# Patient Record
Sex: FEMALE | Race: WHITE | NOT HISPANIC OR LATINO | Employment: UNEMPLOYED | URBAN - METROPOLITAN AREA
[De-identification: names, ages, dates, MRNs, and addresses within clinical notes are randomized per-mention and may not be internally consistent; named-entity substitution may affect disease eponyms.]

---

## 2018-09-10 PROCEDURE — 99284 EMERGENCY DEPT VISIT MOD MDM: CPT

## 2018-09-11 ENCOUNTER — APPOINTMENT (OUTPATIENT)
Dept: ULTRASOUND IMAGING | Facility: HOSPITAL | Age: 23
End: 2018-09-11

## 2018-09-11 ENCOUNTER — HOSPITAL ENCOUNTER (INPATIENT)
Facility: HOSPITAL | Age: 23
LOS: 2 days | Discharge: HOME | End: 2018-09-13
Attending: EMERGENCY MEDICINE | Admitting: INTERNAL MEDICINE

## 2018-09-11 ENCOUNTER — APPOINTMENT (OUTPATIENT)
Dept: GENERAL RADIOLOGY | Facility: HOSPITAL | Age: 23
End: 2018-09-11

## 2018-09-11 DIAGNOSIS — R50.9 FEVER IN ADULT: ICD-10-CM

## 2018-09-11 DIAGNOSIS — N12 PYELONEPHRITIS: Primary | ICD-10-CM

## 2018-09-11 DIAGNOSIS — Z34.91 NORMAL INTRAUTERINE PREGNANCY ON PRENATAL ULTRASOUND IN FIRST TRIMESTER: ICD-10-CM

## 2018-09-11 PROBLEM — F15.20 METHAMPHETAMINE ADDICTION (HCC): Status: ACTIVE | Noted: 2018-09-11

## 2018-09-11 PROBLEM — B18.2 CHRONIC HEPATITIS C AFFECTING PREGNANCY, ANTEPARTUM (HCC): Status: ACTIVE | Noted: 2018-09-11

## 2018-09-11 PROBLEM — O98.419 CHRONIC HEPATITIS C AFFECTING PREGNANCY, ANTEPARTUM (HCC): Status: ACTIVE | Noted: 2018-09-11

## 2018-09-11 PROBLEM — R79.89 ELEVATED LFTS: Status: ACTIVE | Noted: 2018-09-11

## 2018-09-11 PROBLEM — F19.10 IV DRUG ABUSE (HCC): Status: ACTIVE | Noted: 2018-09-11

## 2018-09-11 PROBLEM — O99.331 TOBACCO SMOKING AFFECTING PREGNANCY IN FIRST TRIMESTER: Status: ACTIVE | Noted: 2018-09-11

## 2018-09-11 PROBLEM — A41.9 SEPSIS (HCC): Status: ACTIVE | Noted: 2018-09-11

## 2018-09-11 PROBLEM — Z34.90 PREGNANCY: Status: ACTIVE | Noted: 2018-09-11

## 2018-09-11 LAB
ALBUMIN SERPL-MCNC: 3.9 G/DL (ref 3.5–5.2)
ALBUMIN/GLOB SERPL: 1.2 G/DL
ALP SERPL-CCNC: 54 U/L (ref 39–117)
ALT SERPL W P-5'-P-CCNC: 147 U/L (ref 1–33)
AMPHET+METHAMPHET UR QL: NEGATIVE
ANION GAP SERPL CALCULATED.3IONS-SCNC: 9.7 MMOL/L
AST SERPL-CCNC: 65 U/L (ref 1–32)
BACTERIA UR QL AUTO: ABNORMAL /HPF
BARBITURATES UR QL SCN: NEGATIVE
BASOPHILS # BLD AUTO: 0.01 10*3/MM3 (ref 0–0.2)
BASOPHILS NFR BLD AUTO: 0.1 % (ref 0–1.5)
BENZODIAZ UR QL SCN: NEGATIVE
BILIRUB SERPL-MCNC: 0.2 MG/DL (ref 0.1–1.2)
BILIRUB UR QL STRIP: NEGATIVE
BUN BLD-MCNC: 10 MG/DL (ref 6–20)
BUN/CREAT SERPL: 15.6 (ref 7–25)
CALCIUM SPEC-SCNC: 9.2 MG/DL (ref 8.6–10.5)
CANNABINOIDS SERPL QL: NEGATIVE
CHLORIDE SERPL-SCNC: 101 MMOL/L (ref 98–107)
CLARITY UR: ABNORMAL
CO2 SERPL-SCNC: 24.3 MMOL/L (ref 22–29)
COCAINE UR QL: NEGATIVE
COLOR UR: YELLOW
CREAT BLD-MCNC: 0.64 MG/DL (ref 0.57–1)
CRP SERPL-MCNC: 1.73 MG/DL (ref 0–0.5)
D-LACTATE SERPL-SCNC: 1.8 MMOL/L (ref 0.5–2)
DEPRECATED RDW RBC AUTO: 48.5 FL (ref 37–54)
EOSINOPHIL # BLD AUTO: 0.09 10*3/MM3 (ref 0–0.7)
EOSINOPHIL NFR BLD AUTO: 0.7 % (ref 0.3–6.2)
ERYTHROCYTE [DISTWIDTH] IN BLOOD BY AUTOMATED COUNT: 14.2 % (ref 11.7–13)
ERYTHROCYTE [SEDIMENTATION RATE] IN BLOOD: 30 MM/HR (ref 0–20)
GFR SERPL CREATININE-BSD FRML MDRD: 117 ML/MIN/1.73
GLOBULIN UR ELPH-MCNC: 3.2 GM/DL
GLUCOSE BLD-MCNC: 102 MG/DL (ref 65–99)
GLUCOSE BLDC GLUCOMTR-MCNC: 107 MG/DL (ref 70–130)
GLUCOSE UR STRIP-MCNC: NEGATIVE MG/DL
HAV IGM SERPL QL IA: ABNORMAL
HBV CORE IGM SERPL QL IA: ABNORMAL
HBV SURFACE AG SERPL QL IA: ABNORMAL
HCG INTACT+B SERPL-ACNC: NORMAL MIU/ML
HCG SERPL QL: POSITIVE
HCT VFR BLD AUTO: 38.4 % (ref 35.6–45.5)
HCV AB SER DONR QL: REACTIVE
HGB BLD-MCNC: 12 G/DL (ref 11.9–15.5)
HGB UR QL STRIP.AUTO: NEGATIVE
HIV1 P24 AG SER QL: NORMAL
HIV1+2 AB SER QL: NORMAL
HYALINE CASTS UR QL AUTO: ABNORMAL /LPF
IMM GRANULOCYTES # BLD: 0.03 10*3/MM3 (ref 0–0.03)
IMM GRANULOCYTES NFR BLD: 0.2 % (ref 0–0.5)
KETONES UR QL STRIP: NEGATIVE
LEUKOCYTE ESTERASE UR QL STRIP.AUTO: ABNORMAL
LYMPHOCYTES # BLD AUTO: 1.68 10*3/MM3 (ref 0.9–4.8)
LYMPHOCYTES NFR BLD AUTO: 12.9 % (ref 19.6–45.3)
MCH RBC QN AUTO: 29.1 PG (ref 26.9–32)
MCHC RBC AUTO-ENTMCNC: 31.3 G/DL (ref 32.4–36.3)
MCV RBC AUTO: 93 FL (ref 80.5–98.2)
METHADONE UR QL SCN: NEGATIVE
MONOCYTES # BLD AUTO: 0.64 10*3/MM3 (ref 0.2–1.2)
MONOCYTES NFR BLD AUTO: 4.9 % (ref 5–12)
NEUTROPHILS # BLD AUTO: 10.61 10*3/MM3 (ref 1.9–8.1)
NEUTROPHILS NFR BLD AUTO: 81.2 % (ref 42.7–76)
NITRITE UR QL STRIP: NEGATIVE
OPIATES UR QL: NEGATIVE
OXYCODONE UR QL SCN: NEGATIVE
PH UR STRIP.AUTO: 8.5 [PH] (ref 5–8)
PLATELET # BLD AUTO: 211 10*3/MM3 (ref 140–500)
PMV BLD AUTO: 11 FL (ref 6–12)
POTASSIUM BLD-SCNC: 4.5 MMOL/L (ref 3.5–5.2)
PROCALCITONIN SERPL-MCNC: 0.06 NG/ML (ref 0.1–0.25)
PROT SERPL-MCNC: 7.1 G/DL (ref 6–8.5)
PROT UR QL STRIP: NEGATIVE
RBC # BLD AUTO: 4.13 10*6/MM3 (ref 3.9–5.2)
RBC # UR: ABNORMAL /HPF
REF LAB TEST METHOD: ABNORMAL
SODIUM BLD-SCNC: 135 MMOL/L (ref 136–145)
SP GR UR STRIP: 1.02 (ref 1–1.03)
SQUAMOUS #/AREA URNS HPF: ABNORMAL /HPF
UROBILINOGEN UR QL STRIP: ABNORMAL
WBC NRBC COR # BLD: 13.06 10*3/MM3 (ref 4.5–10.7)
WBC UR QL AUTO: ABNORMAL /HPF

## 2018-09-11 PROCEDURE — 87899 AGENT NOS ASSAY W/OPTIC: CPT | Performed by: INTERNAL MEDICINE

## 2018-09-11 PROCEDURE — 85652 RBC SED RATE AUTOMATED: CPT | Performed by: PHYSICIAN ASSISTANT

## 2018-09-11 PROCEDURE — 93976 VASCULAR STUDY: CPT

## 2018-09-11 PROCEDURE — 80307 DRUG TEST PRSMV CHEM ANLYZR: CPT | Performed by: PHYSICIAN ASSISTANT

## 2018-09-11 PROCEDURE — G0432 EIA HIV-1/HIV-2 SCREEN: HCPCS | Performed by: INTERNAL MEDICINE

## 2018-09-11 PROCEDURE — 82962 GLUCOSE BLOOD TEST: CPT

## 2018-09-11 PROCEDURE — 76817 TRANSVAGINAL US OBSTETRIC: CPT

## 2018-09-11 PROCEDURE — 25010000002 PROMETHAZINE PER 50 MG: Performed by: PHYSICIAN ASSISTANT

## 2018-09-11 PROCEDURE — 87186 SC STD MICRODIL/AGAR DIL: CPT | Performed by: INTERNAL MEDICINE

## 2018-09-11 PROCEDURE — 63710000001 PROMETHAZINE PER 25 MG: Performed by: INTERNAL MEDICINE

## 2018-09-11 PROCEDURE — 87040 BLOOD CULTURE FOR BACTERIA: CPT | Performed by: PHYSICIAN ASSISTANT

## 2018-09-11 PROCEDURE — 25010000003 CEFTRIAXONE PER 250 MG: Performed by: EMERGENCY MEDICINE

## 2018-09-11 PROCEDURE — 84703 CHORIONIC GONADOTROPIN ASSAY: CPT | Performed by: PHYSICIAN ASSISTANT

## 2018-09-11 PROCEDURE — 85025 COMPLETE CBC W/AUTO DIFF WBC: CPT | Performed by: PHYSICIAN ASSISTANT

## 2018-09-11 PROCEDURE — 87088 URINE BACTERIA CULTURE: CPT | Performed by: INTERNAL MEDICINE

## 2018-09-11 PROCEDURE — 80074 ACUTE HEPATITIS PANEL: CPT | Performed by: INTERNAL MEDICINE

## 2018-09-11 PROCEDURE — 80053 COMPREHEN METABOLIC PANEL: CPT | Performed by: PHYSICIAN ASSISTANT

## 2018-09-11 PROCEDURE — 87086 URINE CULTURE/COLONY COUNT: CPT | Performed by: INTERNAL MEDICINE

## 2018-09-11 PROCEDURE — 83605 ASSAY OF LACTIC ACID: CPT | Performed by: PHYSICIAN ASSISTANT

## 2018-09-11 PROCEDURE — 84145 PROCALCITONIN (PCT): CPT | Performed by: EMERGENCY MEDICINE

## 2018-09-11 PROCEDURE — 76775 US EXAM ABDO BACK WALL LIM: CPT

## 2018-09-11 PROCEDURE — 86140 C-REACTIVE PROTEIN: CPT | Performed by: PHYSICIAN ASSISTANT

## 2018-09-11 PROCEDURE — 99253 IP/OBS CNSLTJ NEW/EST LOW 45: CPT | Performed by: OBSTETRICS & GYNECOLOGY

## 2018-09-11 PROCEDURE — 76815 OB US LIMITED FETUS(S): CPT

## 2018-09-11 PROCEDURE — 81001 URINALYSIS AUTO W/SCOPE: CPT | Performed by: PHYSICIAN ASSISTANT

## 2018-09-11 PROCEDURE — 84702 CHORIONIC GONADOTROPIN TEST: CPT | Performed by: EMERGENCY MEDICINE

## 2018-09-11 RX ORDER — CALCIUM CARBONATE 200(500)MG
2 TABLET,CHEWABLE ORAL 2 TIMES DAILY PRN
Status: DISCONTINUED | OUTPATIENT
Start: 2018-09-11 | End: 2018-09-13 | Stop reason: HOSPADM

## 2018-09-11 RX ORDER — ACETAMINOPHEN 325 MG/1
650 TABLET ORAL EVERY 4 HOURS PRN
Status: DISCONTINUED | OUTPATIENT
Start: 2018-09-11 | End: 2018-09-13 | Stop reason: HOSPADM

## 2018-09-11 RX ORDER — CEFTRIAXONE SODIUM 1 G/50ML
1 INJECTION, SOLUTION INTRAVENOUS EVERY 24 HOURS
Status: DISCONTINUED | OUTPATIENT
Start: 2018-09-12 | End: 2018-09-13

## 2018-09-11 RX ORDER — CEFTRIAXONE SODIUM 2 G/50ML
2 INJECTION, SOLUTION INTRAVENOUS ONCE
Status: COMPLETED | OUTPATIENT
Start: 2018-09-11 | End: 2018-09-11

## 2018-09-11 RX ORDER — CEFTRIAXONE SODIUM 2 G/50ML
2 INJECTION, SOLUTION INTRAVENOUS EVERY 24 HOURS
Status: DISCONTINUED | OUTPATIENT
Start: 2018-09-12 | End: 2018-09-11

## 2018-09-11 RX ORDER — SODIUM CHLORIDE 0.9 % (FLUSH) 0.9 %
10 SYRINGE (ML) INJECTION AS NEEDED
Status: DISCONTINUED | OUTPATIENT
Start: 2018-09-11 | End: 2018-09-13 | Stop reason: HOSPADM

## 2018-09-11 RX ORDER — PROMETHAZINE HYDROCHLORIDE 25 MG/ML
25 INJECTION, SOLUTION INTRAMUSCULAR; INTRAVENOUS EVERY 4 HOURS PRN
Status: DISCONTINUED | OUTPATIENT
Start: 2018-09-11 | End: 2018-09-13 | Stop reason: HOSPADM

## 2018-09-11 RX ORDER — SODIUM CHLORIDE 0.9 % (FLUSH) 0.9 %
1-10 SYRINGE (ML) INJECTION AS NEEDED
Status: DISCONTINUED | OUTPATIENT
Start: 2018-09-11 | End: 2018-09-13 | Stop reason: HOSPADM

## 2018-09-11 RX ORDER — PROMETHAZINE HYDROCHLORIDE 25 MG/1
25 TABLET ORAL EVERY 4 HOURS PRN
Status: DISCONTINUED | OUTPATIENT
Start: 2018-09-11 | End: 2018-09-13 | Stop reason: HOSPADM

## 2018-09-11 RX ORDER — PROMETHAZINE HYDROCHLORIDE 25 MG/ML
12.5 INJECTION, SOLUTION INTRAMUSCULAR; INTRAVENOUS ONCE
Status: COMPLETED | OUTPATIENT
Start: 2018-09-11 | End: 2018-09-11

## 2018-09-11 RX ORDER — SODIUM CHLORIDE 9 MG/ML
150 INJECTION, SOLUTION INTRAVENOUS CONTINUOUS
Status: DISCONTINUED | OUTPATIENT
Start: 2018-09-11 | End: 2018-09-13 | Stop reason: HOSPADM

## 2018-09-11 RX ADMIN — SODIUM CHLORIDE 150 ML/HR: 9 INJECTION, SOLUTION INTRAVENOUS at 15:00

## 2018-09-11 RX ADMIN — SODIUM CHLORIDE 125 ML/HR: 9 INJECTION, SOLUTION INTRAVENOUS at 02:14

## 2018-09-11 RX ADMIN — SODIUM CHLORIDE 150 ML/HR: 9 INJECTION, SOLUTION INTRAVENOUS at 22:35

## 2018-09-11 RX ADMIN — SODIUM CHLORIDE 500 ML: 9 INJECTION, SOLUTION INTRAVENOUS at 07:52

## 2018-09-11 RX ADMIN — ACETAMINOPHEN 650 MG: 325 TABLET ORAL at 06:26

## 2018-09-11 RX ADMIN — CEFTRIAXONE SODIUM 2 G: 2 INJECTION, SOLUTION INTRAVENOUS at 02:14

## 2018-09-11 RX ADMIN — SODIUM CHLORIDE 1000 ML: 9 INJECTION, SOLUTION INTRAVENOUS at 01:53

## 2018-09-11 RX ADMIN — ACETAMINOPHEN 650 MG: 325 TABLET ORAL at 10:54

## 2018-09-11 RX ADMIN — ACETAMINOPHEN 650 MG: 325 TABLET ORAL at 15:00

## 2018-09-11 RX ADMIN — PROMETHAZINE HYDROCHLORIDE 12.5 MG: 25 INJECTION INTRAMUSCULAR; INTRAVENOUS at 05:27

## 2018-09-11 RX ADMIN — SODIUM CHLORIDE 1000 ML: 9 INJECTION, SOLUTION INTRAVENOUS at 00:34

## 2018-09-11 RX ADMIN — ACETAMINOPHEN 650 MG: 325 TABLET ORAL at 19:09

## 2018-09-11 RX ADMIN — PROMETHAZINE HYDROCHLORIDE 25 MG: 25 TABLET ORAL at 19:10

## 2018-09-11 NOTE — ED TRIAGE NOTES
Pt to ER via PV d/t fevers and generalized body aches. Pt reports it began today. Denies taking temp at home.  Pt also report's having 2 missed periods. Pt was released from [facility] on Thursday.  No coughing or sneezing in last few days.

## 2018-09-11 NOTE — CONSULTS
Consult Note     2018    Patient: Selena Wooten          MR#:[id number]    Chief complaint:  Back pain, malaise     Subjective     Patient is a 23 y.o. female  at 12w1d by dating US today admitted by hospitalist group for pyelonephritis.  Patient reported acute onset yesterday at around 4 PM of worsening malaise, fever and severe constant low back pain that became worse throughout the evening and presented to the emergency room at midnight.    The patient was admitted by the hospitalist group and started on Rocephin.  Since admission she's had some significant fever and tachycardia in the midst of relatively low blood pressures.  The patient received an IV bolus in response to low blood pressures with the associated tachycardia.    The patient's admission data are remarkable for a negative probe calcitonin and lactate with a mildly elevated white count.  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  With daily use ranging from 0.5 g to 7 g for the last 3 years.  The patient reports abstinence for 30 days and adamantly denies additional abuses her polysubstance abuse.  Not unexpectedly the patient's hepatitis C screen is positive on admission with mildly elevated liver function tests    The patient currently is living with her mother and is unemployed and seeking admission to an obstetrical residential facility called [facility] that offers additional treatment and resources for substance abuse    Patient has a confirmed appointment with [facility] Dr. Swenson on       Prenatal care complicated by items listed in the problem list below    Patient Active Problem List   Diagnosis   • Pyelonephritis   • Sepsis (CMS/HCC)   • Pregnancy   • Elevated LFTs   • IV drug abuse   • Tobacco smoking affecting pregnancy in first trimester   •  Chronic hepatitis C affecting pregnancy, antepartum (CMS/HCC)   • Methamphetamine addiction (CMS/HCC)       Past Medical History:   Diagnosis Date   • Asthma     during childhood   • IV drug abuse    • Raynaud disease        History reviewed. No pertinent surgical history.    Obstetric History:  OB History      Para Term  AB Living    1              SAB TAB Ectopic Molar Multiple Live Births                        Menstrual History:     Patient's last menstrual period was 2018 (approximate).       #: 1, Date: None, Sex: None, Weight: None, GA: None, Delivery: None, Apgar1: None, Apgar5: None, Living: None, Birth Comments: None      Family History   Problem Relation Age of Onset   • No Known Problems Mother    • No Known Problems Father        Social History   Substance Use Topics   • Smoking status: Current Every Day Smoker     Packs/day: 0.50     Types: Cigarettes   • Smokeless tobacco: Never Used   • Alcohol use No       Patient has no known allergies.      Current Facility-Administered Medications:   •  acetaminophen (TYLENOL) tablet 650 mg, 650 mg, Oral, Q4H PRN, Min Puentes MD, 650 mg at 18 1054  •  calcium carbonate (TUMS) chewable tablet 500 mg (200 mg elemental), 2 tablet, Oral, BID PRN, Min Puentes MD  •  [START ON 2018] cefTRIAXone (ROCEPHIN) IVPB 1 g, 1 g, Intravenous, Q24H, Leticia Lyle MD  •  promethazine (PHENERGAN) injection 25 mg, 25 mg, Intramuscular, Q4H PRN, Leticia Lyle MD  •  promethazine (PHENERGAN) tablet 25 mg, 25 mg, Oral, Q4H PRN, Leticia Lyle MD  •  sodium chloride 0.9 % flush 1-10 mL, 1-10 mL, Intravenous, PRN, Min Puentes MD  •  Insert peripheral IV, , , Once **AND** sodium chloride 0.9 % flush 10 mL, 10 mL, Intravenous, PRN, Marco Hester III, PA  •  sodium chloride 0.9 % infusion, 150 mL/hr, Intravenous, Continuous, Leticia Lyle MD, Last Rate: 150 mL/hr at 18 0948, 150 mL/hr at  09/11/18 0948    Review of Systems  Review of Systems   Constitutional: Positive for diaphoresis, fatigue and fever.   HENT: Negative.    Eyes: Negative.    Respiratory: Negative.    Cardiovascular: Negative.    Gastrointestinal: Positive for nausea and vomiting. Negative for abdominal distention, abdominal pain and constipation.   Endocrine: Negative for cold intolerance.   Genitourinary: Positive for flank pain. Negative for genital sores and pelvic pain.   Musculoskeletal: Positive for arthralgias, back pain and myalgias.   Skin: Negative.    Allergic/Immunologic: Negative.  Negative for immunocompromised state.   Neurological: Negative.    Hematological: Negative.    Psychiatric/Behavioral: Negative.        Objective     Vital Signs  Temp:  [98.4 °F (36.9 °C)-103.4 °F (39.7 °C)] 99.9 °F (37.7 °C)  Heart Rate:  [] 115  Resp:  [16-18] 16  BP: ()/(54-70) 107/58    Physical Exam:  Physical Exam   Constitutional: She is oriented to person, place, and time. She appears well-developed and well-nourished. No distress.   Somewhat agitated but polite with pressured speech   Eyes: Pupils are equal, round, and reactive to light. EOM are normal.   Cardiovascular: Regular rhythm.    No murmur heard.  Mild tachycardia    Pulmonary/Chest: Effort normal and breath sounds normal.   Abdominal: Soft. Bowel sounds are normal. She exhibits no distension. There is no tenderness. There is no guarding.   Genitourinary: Uterus normal.   Musculoskeletal: Normal range of motion.   Neurological: She is alert and oriented to person, place, and time. She displays normal reflexes. No cranial nerve deficit. Coordination normal.   Skin: Skin is warm and dry. She is not diaphoretic.   Psychiatric: Judgment and thought content normal.   Mildly agitated with pressured speech       Labs:  Lab Results (last 24 hours)     Procedure Component Value Units Date/Time    Blood Culture - Blood, [[id number]]  (Normal) Collected:  09/11/18 0026     Specimen:  Blood from Arm, Right Updated:  09/11/18 1230     Blood Culture No growth at less than 24 hours    CBC & Differential [[id number]] Collected:  09/11/18 0027    Specimen:  Blood Updated:  09/11/18 0046    Narrative:       The following orders were created for panel order CBC & Differential.  Procedure                               Abnormality         Status                     ---------                               -----------         ------                     CBC Auto Differential[[id number]]        Abnormal            Final result                 Please view results for these tests on the individual orders.    Comprehensive Metabolic Panel [[id number]]  (Abnormal) Collected:  09/11/18 0027    Specimen:  Blood Updated:  09/11/18 0110     Glucose 102 (H) mg/dL      BUN 10 mg/dL      Creatinine 0.64 mg/dL      Sodium 135 (L) mmol/L      Potassium 4.5 mmol/L      Chloride 101 mmol/L      CO2 24.3 mmol/L      Calcium 9.2 mg/dL      Total Protein 7.1 g/dL      Albumin 3.90 g/dL      ALT (SGPT) 147 (H) U/L      AST (SGOT) 65 (H) U/L      Alkaline Phosphatase 54 U/L      Total Bilirubin 0.2 mg/dL      eGFR Non African Amer 117 mL/min/1.73      Globulin 3.2 gm/dL      A/G Ratio 1.2 g/dL      BUN/Creatinine Ratio 15.6     Anion Gap 9.7 mmol/L     Blood Culture - Blood, [[id number]]  (Normal) Collected:  09/11/18 0027    Specimen:  Blood from Arm, Left Updated:  09/11/18 1246     Blood Culture No growth at less than 24 hours    Lactic Acid, Plasma [[id number]]  (Normal) Collected:  09/11/18 0027    Specimen:  Blood Updated:  09/11/18 0103     Lactate 1.8 mmol/L     Sedimentation Rate [[id number]]  (Abnormal) Collected:  09/11/18 0027    Specimen:  Blood Updated:  09/11/18 0110     Sed Rate 30 (H) mm/hr     C-reactive Protein [[id number]]  (Abnormal) Collected:  09/11/18 0027    Specimen:  Blood Updated:  09/11/18 0110     C-Reactive Protein 1.73 (H) mg/dL     CBC Auto Differential [[id number]]  (Abnormal) Collected:   "09/11/18 0027    Specimen:  Blood Updated:  09/11/18 0046     WBC 13.06 (H) 10*3/mm3      RBC 4.13 10*6/mm3      Hemoglobin 12.0 g/dL      Hematocrit 38.4 %      MCV 93.0 fL      MCH 29.1 pg      MCHC 31.3 (L) g/dL      RDW 14.2 (H) %      RDW-SD 48.5 fl      MPV 11.0 fL      Platelets 211 10*3/mm3      Neutrophil % 81.2 (H) %      Lymphocyte % 12.9 (L) %      Monocyte % 4.9 (L) %      Eosinophil % 0.7 %      Basophil % 0.1 %      Immature Grans % 0.2 %      Neutrophils, Absolute 10.61 (H) 10*3/mm3      Lymphocytes, Absolute 1.68 10*3/mm3      Monocytes, Absolute 0.64 10*3/mm3      Eosinophils, Absolute 0.09 10*3/mm3      Basophils, Absolute 0.01 10*3/mm3      Immature Grans, Absolute 0.03 10*3/mm3     Procalcitonin [[id number]]  (Abnormal) Collected:  09/11/18 0027    Specimen:  Blood Updated:  09/11/18 0157     Procalcitonin 0.06 (L) ng/mL     Narrative:       As a Marker for Sepsis (Non-Neonates):   1. <0.5 ng/mL represents a low risk of severe sepsis and/or septic shock.  1. >2 ng/mL represents a high risk of severe sepsis and/or septic shock.    As a Marker for Lower Respiratory Tract Infections that require antibiotic therapy:  PCT on Admission     Antibiotic Therapy             6-12 Hrs later  > 0.5                Strongly Recommended            >0.25 - <0.5         Recommended  0.1 - 0.25           Discouraged                   Remeasure/reassess PCT  <0.1                 Strongly Discouraged          Remeasure/reassess PCT      As 28 day mortality risk marker: \"Change in Procalcitonin Result\" (> 80 % or <=80 %) if Day 0 (or Day 1) and Day 4 values are available. Refer to http://[email or web address]-pct-calculator.com/   Change in PCT <=80 %   A decrease of PCT levels below or equal to 80 % defines a positive change in PCT test result representing a higher risk for 28-day all-cause mortality of patients diagnosed with severe sepsis or septic shock.  Change in PCT > 80 %   A decrease of PCT levels of more than 80 % " defines a negative change in PCT result representing a lower risk for 28-day all-cause mortality of patients diagnosed with severe sepsis or septic shock.                hCG, Serum, Qualitative [[id number]]  (Abnormal) Collected:  09/11/18 0035    Specimen:  Blood Updated:  09/11/18 0140     HCG Qualitative Positive (A)    hCG, Quantitative, Pregnancy [[id number]] Collected:  09/11/18 0035    Specimen:  Blood Updated:  09/11/18 0255     HCG Quantitative 181,400.00 mIU/mL     Narrative:       HCG Ranges by Gestational Age    3 Weeks         5.4 -      72 mIU/mL  4 Weeks        10.2 -     708 mIU/mL  5 Weeks       217   -   8,245 mIU/mL  6 Weeks       152   -  32,177 mIU/mL  7 Weeks     4,059   - 153,767 mIU/mL  8 Weeks    31,366   - 149,094 mIU/mL  9 Weeks    59,109   - 135,901 mIU/mL  10 Weeks   44,186   - 170,409 mIU/mL  12 Weeks   27,107   - 201,615 mIU/mL  14 Weeks   24,302   -  93,646 mIU/mL  15 Weeks   12,540   -  69,747 mIU/mL  16 Weeks    8,904   -  55,332 mIU/mL  17 Weeks    8,240   -  51,793 mIU/mL  18 Weeks    9,649   -  55,271 mIU/mL    Urine Culture - Urine, [[id number]] Collected:  09/11/18 0108    Specimen:  Urine from Urine, Clean Catch Updated:  09/11/18 1049    Urinalysis With Microscopic If Indicated (No Culture) - Urine, Clean Catch [[id number]]  (Abnormal) Collected:  09/11/18 0120    Specimen:  Urine from Urine, Clean Catch Updated:  09/11/18 0135     Color, UA Yellow     Appearance, UA Cloudy (A)     pH, UA 8.5 (H)     Specific Gravity, UA 1.017     Glucose, UA Negative     Ketones, UA Negative     Bilirubin, UA Negative     Blood, UA Negative     Protein, UA Negative     Leuk Esterase, UA Moderate (2+) (A)     Nitrite, UA Negative     Urobilinogen, UA 0.2 E.U./dL    Urine Drug Screen - Urine, Clean Catch [[id number]]  (Normal) Collected:  09/11/18 0120    Specimen:  Urine from Urine, Clean Catch Updated:  09/11/18 0154     Amphet/Methamphet, Screen Negative     Barbiturates Screen, Urine Negative      Benzodiazepine Screen, Urine Negative     Cocaine Screen, Urine Negative     Opiate Screen Negative     THC, Screen, Urine Negative     Methadone Screen, Urine Negative     Oxycodone Screen, Urine Negative    Narrative:       Negative Thresholds For Drugs Screened:     Amphetamines               500 ng/ml   Barbiturates               200 ng/ml   Benzodiazepines            100 ng/ml   Cocaine                    300 ng/ml   Methadone                  300 ng/ml   Opiates                    300 ng/ml   Oxycodone                  100 ng/ml   THC                        50 ng/ml    The Normal Value for all drugs tested is negative. This report includes final unconfirmed screening results to be used for medical treatment purposes only. Unconfirmed results must not be used for non-medical purposes such as employment or legal testing. Clinical consideration should be applied to any drug of abuse test, particulary when unconfirmed results are used.    Urinalysis, Microscopic Only - Urine, Clean Catch [[id number]]  (Abnormal) Collected:  09/11/18 0120    Specimen:  Urine from Urine, Clean Catch Updated:  09/11/18 0135     RBC, UA 0-2 /HPF      WBC, UA 21-30 (A) /HPF      Bacteria, UA 1+ (A) /HPF      Squamous Epithelial Cells, UA 0-2 /HPF      Hyaline Casts, UA 0-2 /LPF      Methodology Automated Microscopy    POC Glucose Once [[id number]]  (Normal) Collected:  09/11/18 0748    Specimen:  Blood Updated:  09/11/18 0749     Glucose 107 mg/dL     Narrative:       Meter: [id number] : [id number] Jose Paniagua RN (Katie)    Hepatitis Panel, Acute [[id number]]  (Abnormal) Collected:  09/11/18 1139    Specimen:  Blood Updated:  09/11/18 1323     Hepatitis B Surface Ag Non-Reactive     Hep A IgM Non-Reactive     Hep B C IgM Non-Reactive     Hepatitis C Ab Reactive (C)    HIV-1 & HIV-2 Antibodies [[id number]] Collected:  09/11/18 1139    Specimen:  Blood Updated:  09/11/18 1206    Narrative:       The following orders were created for  panel order HIV-1 & HIV-2 Antibodies.  Procedure                               Abnormality         Status                     ---------                               -----------         ------                     HIV-1 / O / 2 Ag / Antib...[[id number]]                      In process                   Please view results for these tests on the individual orders.    HIV-1 / O / 2 Ag / Antibody 4th Generation [[id number]] Collected:  09/11/18 1139    Specimen:  Blood Updated:  09/11/18 1206            Assessment/Plan     1. Intrauterine Pregnancy at 12w1d  - Has an appointment to establish obstetrical care on October 12 with [facility]  2.  Extensive history of IV methamphetamine abuse  - At exceptional risk for recidivistic/relapse  - At risk for subacute bacterial endocarditis.  Would consider echo before discharge  3.  Hepatitis C in pregnancy  - plan Hep C viral load.  As below elevated liver function tests are like related to the hepatitis C  - General risk of transmission of disease to the fetus is approximately 5%  4.  Elevated liver function tests likely related to hepatitis C  - will monitor   5.  Tobacco use in pregnancy  - Discussed reduction in cessation  6.  Pyelonephritis in pregnancy with elements of sepsis  - Treatment appropriate as ordered by admitting physician  -  Suggest treatment until the patient is 48 hours afebrile minimum and then home on Keflex 500 3 times a day for 10 days pending sensitivity.  Thereafter the patient will need daily Macrobid suppression for the remainder of the pregnancy  7.  Sepsis  - Mixed picture for sepsis.  Tachycardia with high fever and hypotension but normal procalcitonin and lactate    Principal Problem:    Sepsis (CMS/HCC)  Active Problems:    Pyelonephritis    Pregnancy    Elevated LFTs    IV drug abuse    Tobacco smoking affecting pregnancy in first trimester    Chronic hepatitis C affecting pregnancy, antepartum (CMS/HCC)    Methamphetamine  addiction (CMS/[facility])      Kain Wallace MD  09/11/18  2:30 PM      Patient Care Team:  Jayro Mccauley MD as PCP - General (Family Medicine)

## 2018-09-11 NOTE — H&P
Name: Selena Wooten ADMIT: 2018   : 1995  PCP: Jayro Mccauley MD    MRN: [id number] LOS: 0 days   AGE/SEX: 23 y.o. female  ROOM: 645/1     Chief Complaint   Patient presents with   • Fever   • Generalized Body Aches        History of Present Illness  24 yo female who presented to the ER with chills, generalized myalgias and low back pain that started yesterday.  She has also had nausea and vomiting. She has a h/o previous UTIs and felt that she had another one. She has been febrile with a leukocytosis. She was given IV ceftriaxone in the ER and admitted to a floor bed. I was called early this morning when the patient's temp went to 103.4 and her BP dropped to 94/54. previously it had been 116/65. She was bolused with NS and transferred to a telemetry bed. Her last BP was 104/54.      Past Medical History:   Diagnosis Date   • Asthma     during childhood   • IV drug abuse    • Raynaud disease      History reviewed. No pertinent surgical history.    Allergies:  Patient has no known allergies.    No prescriptions prior to admission.       Social History   Substance Use Topics   • Smoking status: Current Every Day Smoker     Packs/day: 0.50     Types: Cigarettes   • Smokeless tobacco: Never Used   • Alcohol use No       Family History   Problem Relation Age of Onset   • No Known Problems Mother    • No Known Problems Father        Review of Systems   Constitutional: weight stable. Appetite good  HEENT: No vision changes. No rhinorrhea, sore throat.  Not hard of hearing.   Respiratory: negative for shortness of breath, PND or HINOJOSA.  No cough or chest tightness. Had asthma as a child but no longer has any problem  Cardiovascular: No chest pain or palpitations.  No problems with edema  Gastrointestinal: No problems with heartburn or indigestion. Bowel movements normal. No blood noted in stools. No melena  Endocrine:  no diabetes   Genitourinary:see HPI  Musculoskeletal:see HPI  Skin: No rash or wound.    Neurological: Negative for syncope or headaches. No paresthesias or focal weakness  Hematological:  Does not bruise/bleed easily. No h/o DVTs  Psychiatric/Behavioral: No confusion. The patient is not nervous/anxious.       Objective    Vital Signs  Temp:  [100.2 °F (37.9 °C)-103.4 °F (39.7 °C)] 100.2 °F (37.9 °C)  Heart Rate:  [] 91  Resp:  [16-18] 18  BP: ()/(54-70) 104/54  SpO2:  [98 %-100 %] 98 %  on   ;   Device (Oxygen Therapy): room air  Body mass index is 26.9 kg/m².    Physical Exam   WDWN female in mild distress secondary to having chills and back pain  PERRL, EOMI, sclera nonicteric. Oropharynx benign, tongue midline, palate elevates symmetrically. Neck supple without adenopathy or thyromegaly. No JVD.  Lungs clear with equal breath sounds bilaterally. No wheezes, rales or rhonchi. Breathing nonlabored  Heart RRR without murmur, gallop or rub.   Back no kyphosis  Abdomen soft, tender in the suprapubic area. Bowel sounds present but decreased  Extremities no cyanosis, clubbing or edema.   Skin warm & dry  Neuro no gross motor deficits, alert & oriented. Speech fluent.       Results Review:   I reviewed the patient's new clinical results.    Lab Results (last 24 hours)     Procedure Component Value Units Date/Time    Blood Culture - Blood, [[id number]] Collected:  09/11/18 0026    Specimen:  Blood from Arm, Right Updated:  09/11/18 0029    CBC & Differential [[id number]] Collected:  09/11/18 0027    Specimen:  Blood Updated:  09/11/18 0046    Narrative:       The following orders were created for panel order CBC & Differential.  Procedure                               Abnormality         Status                     ---------                               -----------         ------                     CBC Auto Differential[[id number]]        Abnormal            Final result                 Please view results for these tests on the individual orders.    Comprehensive Metabolic Panel [[id number]]   (Abnormal) Collected:  09/11/18 0027    Specimen:  Blood Updated:  09/11/18 0110     Glucose 102 (H) mg/dL      BUN 10 mg/dL      Creatinine 0.64 mg/dL      Sodium 135 (L) mmol/L      Potassium 4.5 mmol/L      Chloride 101 mmol/L      CO2 24.3 mmol/L      Calcium 9.2 mg/dL      Total Protein 7.1 g/dL      Albumin 3.90 g/dL      ALT (SGPT) 147 (H) U/L      AST (SGOT) 65 (H) U/L      Alkaline Phosphatase 54 U/L      Total Bilirubin 0.2 mg/dL      eGFR Non African Amer 117 mL/min/1.73      Globulin 3.2 gm/dL      A/G Ratio 1.2 g/dL      BUN/Creatinine Ratio 15.6     Anion Gap 9.7 mmol/L     Blood Culture - Blood, [[id number]] Collected:  09/11/18 0027    Specimen:  Blood from Arm, Left Updated:  09/11/18 0037    Lactic Acid, Plasma [[id number]]  (Normal) Collected:  09/11/18 0027    Specimen:  Blood Updated:  09/11/18 0103     Lactate 1.8 mmol/L     Sedimentation Rate [[id number]]  (Abnormal) Collected:  09/11/18 0027    Specimen:  Blood Updated:  09/11/18 0110     Sed Rate 30 (H) mm/hr     C-reactive Protein [[id number]]  (Abnormal) Collected:  09/11/18 0027    Specimen:  Blood Updated:  09/11/18 0110     C-Reactive Protein 1.73 (H) mg/dL     CBC Auto Differential [[id number]]  (Abnormal) Collected:  09/11/18 0027    Specimen:  Blood Updated:  09/11/18 0046     WBC 13.06 (H) 10*3/mm3      RBC 4.13 10*6/mm3      Hemoglobin 12.0 g/dL      Hematocrit 38.4 %      MCV 93.0 fL      MCH 29.1 pg      MCHC 31.3 (L) g/dL      RDW 14.2 (H) %      RDW-SD 48.5 fl      MPV 11.0 fL      Platelets 211 10*3/mm3      Neutrophil % 81.2 (H) %      Lymphocyte % 12.9 (L) %      Monocyte % 4.9 (L) %      Eosinophil % 0.7 %      Basophil % 0.1 %      Immature Grans % 0.2 %      Neutrophils, Absolute 10.61 (H) 10*3/mm3      Lymphocytes, Absolute 1.68 10*3/mm3      Monocytes, Absolute 0.64 10*3/mm3      Eosinophils, Absolute 0.09 10*3/mm3      Basophils, Absolute 0.01 10*3/mm3      Immature Grans, Absolute 0.03 10*3/mm3     Procalcitonin  "[[id number]]  (Abnormal) Collected:  09/11/18 0027    Specimen:  Blood Updated:  09/11/18 0157     Procalcitonin 0.06 (L) ng/mL     Narrative:       As a Marker for Sepsis (Non-Neonates):   1. <0.5 ng/mL represents a low risk of severe sepsis and/or septic shock.  1. >2 ng/mL represents a high risk of severe sepsis and/or septic shock.    As a Marker for Lower Respiratory Tract Infections that require antibiotic therapy:  PCT on Admission     Antibiotic Therapy             6-12 Hrs later  > 0.5                Strongly Recommended            >0.25 - <0.5         Recommended  0.1 - 0.25           Discouraged                   Remeasure/reassess PCT  <0.1                 Strongly Discouraged          Remeasure/reassess PCT      As 28 day mortality risk marker: \"Change in Procalcitonin Result\" (> 80 % or <=80 %) if Day 0 (or Day 1) and Day 4 values are available. Refer to http://www.Pand[employer]ct-calculator.[employer]/   Change in PCT <=80 %   A decrease of PCT levels below or equal to 80 % defines a positive change in PCT test result representing a higher risk for 28-day all-cause mortality of patients diagnosed with severe sepsis or septic shock.  Change in PCT > 80 %   A decrease of PCT levels of more than 80 % defines a negative change in PCT result representing a lower risk for 28-day all-cause mortality of patients diagnosed with severe sepsis or septic shock.                hCG, Serum, Qualitative [[id number]]  (Abnormal) Collected:  09/11/18 0035    Specimen:  Blood Updated:  09/11/18 0140     HCG Qualitative Positive (A)    hCG, Quantitative, Pregnancy [[id number]] Collected:  09/11/18 0035    Specimen:  Blood Updated:  09/11/18 0255     HCG Quantitative 181,400.00 mIU/mL     Narrative:       HCG Ranges by Gestational Age    3 Weeks         5.4 -      72 mIU/mL  4 Weeks        10.2 -     708 mIU/mL  5 Weeks       217   -   8,245 mIU/mL  6 Weeks       152   -  32,177 mIU/mL  7 Weeks     4,059   - 153,767 mIU/mL  8 Weeks    " 31,366   - 149,094 mIU/mL  9 Weeks    59,109   - 135,901 mIU/mL  10 Weeks   44,186   - 170,409 mIU/mL  12 Weeks   27,107   - 201,615 mIU/mL  14 Weeks   24,302   -  93,646 mIU/mL  15 Weeks   12,540   -  69,747 mIU/mL  16 Weeks    8,904   -  55,332 mIU/mL  17 Weeks    8,240   -  51,793 mIU/mL  18 Weeks    9,649   -  55,271 mIU/mL    Urine Culture - Urine, [[id number]] Collected:  09/11/18 0108    Specimen:  Urine from Urine, Clean Catch Updated:  09/11/18 1049    Urinalysis With Microscopic If Indicated (No Culture) - Urine, Clean Catch [[id number]]  (Abnormal) Collected:  09/11/18 0120    Specimen:  Urine from Urine, Clean Catch Updated:  09/11/18 0135     Color, UA Yellow     Appearance, UA Cloudy (A)     pH, UA 8.5 (H)     Specific Gravity, UA 1.017     Glucose, UA Negative     Ketones, UA Negative     Bilirubin, UA Negative     Blood, UA Negative     Protein, UA Negative     Leuk Esterase, UA Moderate (2+) (A)     Nitrite, UA Negative     Urobilinogen, UA 0.2 E.U./dL    Urine Drug Screen - Urine, Clean Catch [[id number]]  (Normal) Collected:  09/11/18 0120    Specimen:  Urine from Urine, Clean Catch Updated:  09/11/18 0154     Amphet/Methamphet, Screen Negative     Barbiturates Screen, Urine Negative     Benzodiazepine Screen, Urine Negative     Cocaine Screen, Urine Negative     Opiate Screen Negative     THC, Screen, Urine Negative     Methadone Screen, Urine Negative     Oxycodone Screen, Urine Negative    Narrative:       Negative Thresholds For Drugs Screened:     Amphetamines               500 ng/ml   Barbiturates               200 ng/ml   Benzodiazepines            100 ng/ml   Cocaine                    300 ng/ml   Methadone                  300 ng/ml   Opiates                    300 ng/ml   Oxycodone                  100 ng/ml   THC                        50 ng/ml    The Normal Value for all drugs tested is negative. This report includes final unconfirmed screening results to be used for medical treatment  purposes only. Unconfirmed results must not be used for non-medical purposes such as employment or legal testing. Clinical consideration should be applied to any drug of abuse test, particulary when unconfirmed results are used.    Urinalysis, Microscopic Only - Urine, Clean Catch [[id number]]  (Abnormal) Collected:  09/11/18 0120    Specimen:  Urine from Urine, Clean Catch Updated:  09/11/18 0135     RBC, UA 0-2 /HPF      WBC, UA 21-30 (A) /HPF      Bacteria, UA 1+ (A) /HPF      Squamous Epithelial Cells, UA 0-2 /HPF      Hyaline Casts, UA 0-2 /LPF      Methodology Automated Microscopy    POC Glucose Once [[id number]]  (Normal) Collected:  09/11/18 0748    Specimen:  Blood Updated:  09/11/18 0749     Glucose 107 mg/dL     Narrative:       Meter: [id number] : [id number] Jose Paniagua (Katie) RN            Results from last 7 days  Lab Units 09/11/18  0027   WBC 10*3/mm3 13.06*   HEMOGLOBIN g/dL 12.0   PLATELETS 10*3/mm3 211       Results from last 7 days  Lab Units 09/11/18  0027   SODIUM mmol/L 135*   POTASSIUM mmol/L 4.5   CHLORIDE mmol/L 101   CO2 mmol/L 24.3   BUN mg/dL 10   CREATININE mg/dL 0.64   GLUCOSE mg/dL 102*   ALBUMIN g/dL 3.90   BILIRUBIN mg/dL 0.2   ALK PHOS U/L 54   AST (SGOT) U/L 65*   ALT (SGPT) U/L 147*   Estimated Creatinine Clearance: 124.5 mL/min (by C-G formula based on SCr of 0.64 mg/dL).        Invalid input(s): LDLCALC    Results from last 7 days  Lab Units 09/11/18  0120   NITRITE UA  Negative   WBC UA /HPF 21-30*   BACTERIA UA /HPF 1+*   SQUAM EPITHEL UA /HPF 0-2       US Renal Bilateral   Final Result   1.  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   Otherwise unremarkable.       This report was finalized on 9/11/2018 5:13 AM by Min Melissa M.D.          US Ob Transvaginal   Final Result   1. Living, single IUP with estimated gestational age of 12 weeks, 1 day       This report was finalized on 9/11/2018 3:26 AM by Min Melissa M.D.           Ob Limited 1 +  Fetuses    (Results Pending)   US Testicular or Ovarian Vascular Limited    (Results Pending)     Assessment/Plan   Assessment:      Active Hospital Problems    Diagnosis Date Noted   • **Sepsis (CMS/HCC) [A41.9] 09/11/2018   • Pyelonephritis [N12] 09/11/2018   • Pregnancy [Z34.90] 09/11/2018   • Elevated LFTs [R79.89] 09/11/2018   • IV drug abuse [F19.10] 09/11/2018      Resolved Hospital Problems    Diagnosis Date Noted Date Resolved   No resolved problems to display.       Plan:     Her lactic acid level was normal but she has been febrile, tachycardic and hypotensive with a leukocytosis so she meets criteria for sepsis. I have continued the IV ceftriaxone but 1 gm q24h should be adequate to treat this. Blood cultures have been obtained and her nurse contacted microbiology to make sure the urine gets cultured.  She does have a h/o IVDA and her AST & ALT are both elevated so there is a good likelihood that she could have hepatitis C. I have ordered a hepatitis panel and HIV.  I will also be placing a consult for OB to see her. Phenergan has been ordered for the nausea and tylenol for pain.          Leticia Lyle MD  [facility]  09/11/18  11:14 AM

## 2018-09-11 NOTE — PROGRESS NOTES
Discharge Planning Assessment  [facility]     Patient Name: Selena Wooten  MRN: [id number]  Today's Date: 9/11/2018    Admit Date: 9/11/2018          Discharge Needs Assessment    No documentation.             Discharge Plan     Row Name 09/11/18 1103       Plan    Plan Comments The patient transferred to [address] [place] [address] from [facility] on 9/11/18 @ 07:55. CCP will screen and follow for any needs that may arise. KAIT Acharya RN, CCP.        Destination     No service coordination in this encounter.      Durable Medical Equipment     No service coordination in this encounter.      Dialysis/Infusion     No service coordination in this encounter.      Home Medical Care     No service coordination in this encounter.      Social Care     No service coordination in this encounter.                Demographic Summary    No documentation.           Functional Status    No documentation.           Psychosocial    No documentation.           Abuse/Neglect    No documentation.           Legal    No documentation.           Substance Abuse    No documentation.           Patient Forms    No documentation.         Kate Acharya RN

## 2018-09-11 NOTE — NURSING NOTE
RRT called regarding patient's blood pressure.  When RRT team arrived , Dr. Lyle had called with orders. Blood sugar 106. VS bp 95/49, HR 90, temp 101.6 and sat.  98. Fluid bolus being given as ordered and awaiting transport to take patient to room 645 for  telemetry.

## 2018-09-11 NOTE — ED PROVIDER NOTES
Pt presents to the ED c/o subject fever that began around 1600. She has associated chills, nausea, frequent urination, and lower back pain. She has hx of UTI and reports this feels similar to prior. She denies cough, dysuria, vaginal discharge, and vomiting. Pt admits to hx of IV drug use but has not used in the past 30 days. Pt also reports her LMP was two months ago and suspects she is pregnant. On exam, pt is a&oX3 in NAD, mild CVA tenderness bilateral, CTAB, HR in low 100s, abd is soft w/ mild suprapubic tenderness, no cervical lymph adenopathy, HENT is unremarkable.  Neck is supple with full range of motion.  I reviewed the patient's lab work talked at length with both the patient and the mother.  The patient clinically has pyelonephritis and she is very likely in her first trimester of pregnancy.  I believe the patient should be admitted to the hospital placed upon IV fluids and IV antibiotics and we will check an ultrasound of her pelvis and of her kidneys.    LAB RESULTS AND RADIOLOGY  I have reviewed the patient's labs and imaging studies.    PROCEDURE    PROGRESS NOTES  0147  Spoke to midlevel provider Marco Hester PA-C, about the pt. After performing my own physical exam, pt will need US to observe fetus and her kidneys. Pt and family told the plan to admit for treatment of UTI. Pt understands and agrees with the plan, all questions answered.    Attestation:    The CORIE and I have discussed this patient's history, physical exam, and treatment plan.  I have reviewed the documentation and personally had a face to face interaction with the patient. I affirm the documentation and agree with the treatment and plan.  The attached note describes my personal findings.    Documentation assistance provided by sierra Lovelace for Dr. Mclean. Information recorded by the scribrashawn was done at my direction and has been verified and validated by me.     Baylee Lovelace  09/11/18 2452       Jose Miguel Mclean,  MD  09/11/18 0243

## 2018-09-11 NOTE — PLAN OF CARE
Problem: Patient Care Overview  Goal: Plan of Care Review  Outcome: Ongoing (interventions implemented as appropriate)   09/11/18 0641   Plan of Care Review   Progress no change   OTHER   Outcome Summary admitted for pyelonephritis/fever, temp 103.4 - admin Tylenol, , BP 94/54, c/o aching abd pain, approx 12 weeks pregnant     Goal: Individualization and Mutuality  Outcome: Ongoing (interventions implemented as appropriate)    Goal: Discharge Needs Assessment  Outcome: Ongoing (interventions implemented as appropriate)    Goal: Interprofessional Rounds/Family Conf  Outcome: Ongoing (interventions implemented as appropriate)      Problem: Pain, Acute (Adult)  Goal: Identify Related Risk Factors and Signs and Symptoms  Outcome: Ongoing (interventions implemented as appropriate)    Goal: Acceptable Pain Control/Comfort Level  Outcome: Ongoing (interventions implemented as appropriate)

## 2018-09-11 NOTE — ED PROVIDER NOTES
" EMERGENCY DEPARTMENT ENCOUNTER    CHIEF COMPLAINT  Chief Complaint: Fever   History given by: Patient   History limited by: none  Room Number: LUIS F/LUIS F  PMD: Jayro Mccauley MD      HPI:  Pt is a 23 y.o. female who presents complaining of subjective fever that began this morning. Pt confirms generalized myalgias and intermittent \"shooting\" bilateral rib pain, but denies cough, sore throat, or chest pain. Pt states she recently discovered she was pregnant so has not been able to take any of her medication. Pt is unsure of how far along her pregnancy is, states she has missed two periods. Per pt, she has been managing symptoms with Tylenol. Pt denies recreational drug usage.     Duration:  More than 12 hours   Onset: gradual   Timing: constant   Location: generalized   Radiation: none  Quality: fever   Intensity/Severity: mild   Progression: unchanged   Associated Symptoms: generalized myalgias, \"shooting\" rib pain   Aggravating Factors: unknown   Alleviating Factors: none  Previous Episodes: none  Treatment before arrival: Pt has been managing fever with Tylenol.     PAST MEDICAL HISTORY  Active Ambulatory Problems     Diagnosis Date Noted   • No Active Ambulatory Problems     Resolved Ambulatory Problems     Diagnosis Date Noted   • No Resolved Ambulatory Problems     Past Medical History:   Diagnosis Date   • Raynaud disease        PAST SURGICAL HISTORY  History reviewed. No pertinent surgical history.    FAMILY HISTORY  History reviewed. No pertinent family history.    SOCIAL HISTORY  Social History     Social History   • Marital status: Single     Spouse name: N/A   • Number of children: N/A   • Years of education: N/A     Occupational History   • Not on file.     Social History Main Topics   • Smoking status: Current Every Day Smoker   • Smokeless tobacco: Not on file   • Alcohol use No   • Drug use: No   • Sexual activity: Not on file     Other Topics Concern   • Not on file     Social History Narrative   • " "No narrative on file       ALLERGIES  Patient has no known allergies.    REVIEW OF SYSTEMS  Review of Systems   Constitutional: Positive for fever (subjective).   HENT: Negative for sore throat.    Eyes: Negative.    Respiratory: Negative for cough and shortness of breath.    Cardiovascular: Negative for chest pain (\"shooting\" rib pain).   Gastrointestinal: Negative for abdominal pain, diarrhea and vomiting.   Genitourinary: Negative for dysuria.   Musculoskeletal: Positive for myalgias (generalized). Negative for neck pain.   Skin: Negative for rash.   Allergic/Immunologic: Negative.    Neurological: Negative for weakness, numbness and headaches.   Hematological: Negative.    Psychiatric/Behavioral: Negative.    All other systems reviewed and are negative.      PHYSICAL EXAM  ED Triage Vitals [09/10/18 2352]   Temp Heart Rate Resp BP SpO2   (!) 100.6 °F (38.1 °C) 120 18 -- 100 %      Temp src Heart Rate Source Patient Position BP Location FiO2 (%)   Tympanic -- -- -- --       Physical Exam   Constitutional: She is oriented to person, place, and time. No distress.   HENT:   Head: Normocephalic and atraumatic.   Right Ear: Tympanic membrane normal.   Left Ear: Tympanic membrane normal.   Nose: Nose normal.   Mouth/Throat: Oropharynx is clear and moist.   Eyes: Pupils are equal, round, and reactive to light. EOM are normal.   Neck: Normal range of motion. Neck supple.   Cardiovascular: Normal rate, regular rhythm and normal heart sounds.  Exam reveals no gallop and no friction rub.    No murmur heard.  Pulmonary/Chest: Effort normal and breath sounds normal. No respiratory distress.   Abdominal: Soft. Bowel sounds are normal. There is no tenderness. There is CVA tenderness (mild R sided). There is no rebound and no guarding.   Musculoskeletal: Normal range of motion. She exhibits no edema.   Neurological: She is alert and oriented to person, place, and time. She has normal sensation and normal strength.   Skin: Skin " is warm and dry. No rash noted.   Psychiatric: Mood and affect normal.   Nursing note and vitals reviewed.      LAB RESULTS  Lab Results (last 24 hours)     Procedure Component Value Units Date/Time    Blood Culture - Blood, [[id number]] Collected:  09/11/18 0026    Specimen:  Blood from Arm, Right Updated:  09/11/18 0029    CBC & Differential [[id number]] Collected:  09/11/18 0027    Specimen:  Blood Updated:  09/11/18 0046    Narrative:       The following orders were created for panel order CBC & Differential.  Procedure                               Abnormality         Status                     ---------                               -----------         ------                     CBC Auto Differential[[id number]]        Abnormal            Final result                 Please view results for these tests on the individual orders.    Comprehensive Metabolic Panel [[id number]]  (Abnormal) Collected:  09/11/18 0027    Specimen:  Blood Updated:  09/11/18 0110     Glucose 102 (H) mg/dL      BUN 10 mg/dL      Creatinine 0.64 mg/dL      Sodium 135 (L) mmol/L      Potassium 4.5 mmol/L      Chloride 101 mmol/L      CO2 24.3 mmol/L      Calcium 9.2 mg/dL      Total Protein 7.1 g/dL      Albumin 3.90 g/dL      ALT (SGPT) 147 (H) U/L      AST (SGOT) 65 (H) U/L      Alkaline Phosphatase 54 U/L      Total Bilirubin 0.2 mg/dL      eGFR Non African Amer 117 mL/min/1.73      Globulin 3.2 gm/dL      A/G Ratio 1.2 g/dL      BUN/Creatinine Ratio 15.6     Anion Gap 9.7 mmol/L     Blood Culture - Blood, [[id number]] Collected:  09/11/18 0027    Specimen:  Blood from Arm, Left Updated:  09/11/18 0037    Lactic Acid, Plasma [[id number]]  (Normal) Collected:  09/11/18 0027    Specimen:  Blood Updated:  09/11/18 0103     Lactate 1.8 mmol/L     Sedimentation Rate [[id number]]  (Abnormal) Collected:  09/11/18 0027    Specimen:  Blood Updated:  09/11/18 0110     Sed Rate 30 (H) mm/hr     C-reactive Protein [[id number]]  (Abnormal) Collected:   "09/11/18 0027    Specimen:  Blood Updated:  09/11/18 0110     C-Reactive Protein 1.73 (H) mg/dL     CBC Auto Differential [[id number]]  (Abnormal) Collected:  09/11/18 0027    Specimen:  Blood Updated:  09/11/18 0046     WBC 13.06 (H) 10*3/mm3      RBC 4.13 10*6/mm3      Hemoglobin 12.0 g/dL      Hematocrit 38.4 %      MCV 93.0 fL      MCH 29.1 pg      MCHC 31.3 (L) g/dL      RDW 14.2 (H) %      RDW-SD 48.5 fl      MPV 11.0 fL      Platelets 211 10*3/mm3      Neutrophil % 81.2 (H) %      Lymphocyte % 12.9 (L) %      Monocyte % 4.9 (L) %      Eosinophil % 0.7 %      Basophil % 0.1 %      Immature Grans % 0.2 %      Neutrophils, Absolute 10.61 (H) 10*3/mm3      Lymphocytes, Absolute 1.68 10*3/mm3      Monocytes, Absolute 0.64 10*3/mm3      Eosinophils, Absolute 0.09 10*3/mm3      Basophils, Absolute 0.01 10*3/mm3      Immature Grans, Absolute 0.03 10*3/mm3     Procalcitonin [[id number]]  (Abnormal) Collected:  09/11/18 0027    Specimen:  Blood Updated:  09/11/18 0157     Procalcitonin 0.06 (L) ng/mL     Narrative:       As a Marker for Sepsis (Non-Neonates):   1. <0.5 ng/mL represents a low risk of severe sepsis and/or septic shock.  1. >2 ng/mL represents a high risk of severe sepsis and/or septic shock.    As a Marker for Lower Respiratory Tract Infections that require antibiotic therapy:  PCT on Admission     Antibiotic Therapy             6-12 Hrs later  > 0.5                Strongly Recommended            >0.25 - <0.5         Recommended  0.1 - 0.25           Discouraged                   Remeasure/reassess PCT  <0.1                 Strongly Discouraged          Remeasure/reassess PCT      As 28 day mortality risk marker: \"Change in Procalcitonin Result\" (> 80 % or <=80 %) if Day 0 (or Day 1) and Day 4 values are available. Refer to http://www.[facility]-pct-calculator.com/   Change in PCT <=80 %   A decrease of PCT levels below or equal to 80 % defines a positive change in PCT test result representing a higher risk " for 28-day all-cause mortality of patients diagnosed with severe sepsis or septic shock.  Change in PCT > 80 %   A decrease of PCT levels of more than 80 % defines a negative change in PCT result representing a lower risk for 28-day all-cause mortality of patients diagnosed with severe sepsis or septic shock.                hCG, Serum, Qualitative [[id number]]  (Abnormal) Collected:  09/11/18 0035    Specimen:  Blood Updated:  09/11/18 0140     HCG Qualitative Positive (A)    hCG, Quantitative, Pregnancy [[id number]] Collected:  09/11/18 0035    Specimen:  Blood Updated:  09/11/18 0255     HCG Quantitative 181,400.00 mIU/mL     Narrative:       HCG Ranges by Gestational Age    3 Weeks         5.4 -      72 mIU/mL  4 Weeks        10.2 -     708 mIU/mL  5 Weeks       217   -   8,245 mIU/mL  6 Weeks       152   -  32,177 mIU/mL  7 Weeks     4,059   - 153,767 mIU/mL  8 Weeks    31,366   - 149,094 mIU/mL  9 Weeks    59,109   - 135,901 mIU/mL  10 Weeks   44,186   - 170,409 mIU/mL  12 Weeks   27,107   - 201,615 mIU/mL  14 Weeks   24,302   -  93,646 mIU/mL  15 Weeks   12,540   -  69,747 mIU/mL  16 Weeks    8,904   -  55,332 mIU/mL  17 Weeks    8,240   -  51,793 mIU/mL  18 Weeks    9,649   -  55,271 mIU/mL    Urinalysis With Microscopic If Indicated (No Culture) - Urine, Clean Catch [[id number]]  (Abnormal) Collected:  09/11/18 0120    Specimen:  Urine from Urine, Clean Catch Updated:  09/11/18 0135     Color, UA Yellow     Appearance, UA Cloudy (A)     pH, UA 8.5 (H)     Specific Gravity, UA 1.017     Glucose, UA Negative     Ketones, UA Negative     Bilirubin, UA Negative     Blood, UA Negative     Protein, UA Negative     Leuk Esterase, UA Moderate (2+) (A)     Nitrite, UA Negative     Urobilinogen, UA 0.2 E.U./dL    Urine Drug Screen - Urine, Clean Catch [[id number]]  (Normal) Collected:  09/11/18 0120    Specimen:  Urine from Urine, Clean Catch Updated:  09/11/18 0154     Amphet/Methamphet, Screen Negative     Barbiturates  Screen, Urine Negative     Benzodiazepine Screen, Urine Negative     Cocaine Screen, Urine Negative     Opiate Screen Negative     THC, Screen, Urine Negative     Methadone Screen, Urine Negative     Oxycodone Screen, Urine Negative    Narrative:       Negative Thresholds For Drugs Screened:     Amphetamines               500 ng/ml   Barbiturates               200 ng/ml   Benzodiazepines            100 ng/ml   Cocaine                    300 ng/ml   Methadone                  300 ng/ml   Opiates                    300 ng/ml   Oxycodone                  100 ng/ml   THC                        50 ng/ml    The Normal Value for all drugs tested is negative. This report includes final unconfirmed screening results to be used for medical treatment purposes only. Unconfirmed results must not be used for non-medical purposes such as employment or legal testing. Clinical consideration should be applied to any drug of abuse test, particulary when unconfirmed results are used.    Urinalysis, Microscopic Only - Urine, Clean Catch [[id number]]  (Abnormal) Collected:  09/11/18 0120    Specimen:  Urine from Urine, Clean Catch Updated:  09/11/18 0135     RBC, UA 0-2 /HPF      WBC, UA 21-30 (A) /HPF      Bacteria, UA 1+ (A) /HPF      Squamous Epithelial Cells, UA 0-2 /HPF      Hyaline Casts, UA 0-2 /LPF      Methodology Automated Microscopy          I ordered the above labs and reviewed the results    RADIOLOGY  US Renal Bilateral   Final Result   1.  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   Otherwise unremarkable.       This report was finalized on 9/11/2018 5:13 AM by Min Melissa M.D.          US Ob Transvaginal   Final Result   1. Living, single IUP with estimated gestational age of 12 weeks, 1 day       This report was finalized on 9/11/2018 3:26 AM by Min Melissa M.D.          US Ob Limited 1 + Fetuses    (Results Pending)   US Testicular or Ovarian Vascular Limited    (Results Pending)        I  ordered the above noted radiological studies. Interpreted by radiologist. Reviewed by me in PACS.       PROCEDURES  Procedures      PROGRESS AND CONSULTS     0014: Labs ordered for further evaluation.     0144: US Ob ordered.    0219: Pt rechecked and resting comfortably. Discussed results of labs and plan for admission due to possible pyelonephritis and pregnancy. Pt understands and agrees with plan, all questions addressed.     0334: Call placed to A.     0335: Pt rechecked and resting comfortably. Discussed results of US and evidence of single viable IUP at 12 weeks 1 day.     0430: Discussed pt's case with Dr. Puentes ([facility]) who agreed to admit pt to Med/Surg for further treatment of infection and protection of pregnancy.       MEDICAL DECISION MAKING  Results were reviewed/discussed with the patient and they were also made aware of online access. Pt also made aware that some labs, such as cultures, will not be resulted during ER visit and follow up with PMD is necessary.     MDM  Number of Diagnoses or Management Options  Fever in adult:   Normal intrauterine pregnancy on prenatal ultrasound in first trimester:   Pyelonephritis:      Amount and/or Complexity of Data Reviewed  Clinical lab tests: ordered and reviewed (WBC - 13.06  Sed Rate - 30  CRP - 1.73  HCG - 181,400.00  WBC, UA - 21-30, Bacteria, UA - 1+, Leuk Esterase - 2+)  Tests in the radiology section of CPT®: ordered and reviewed (US - living, single IUP with estimated age of 12 weeks, 1 day)  Discuss the patient with other providers: yes (Dr. Puentes ([facility]))           DIAGNOSIS  Final diagnoses:   Pyelonephritis   Fever in adult   Normal intrauterine pregnancy on prenatal ultrasound in first trimester       DISPOSITION  ADMISSION    Discussed treatment plan and reason for admission with pt/family and admitting physician.  Pt/family voiced understanding of the plan for admission for further testing/treatment as needed.         Latest Documented Vital  Signs:  As of 5:58 AM  BP- 112/69 HR- 87 Temp- 100.5 °F (38.1 °C) (Oral) O2 sat- 99%    --  Documentation assistance provided by sierra Zhou for Marco Hester PA-C.  Information recorded by the scribe was done at my direction and has been verified and validated by me.                   Selena Zhou  09/11/18 0433       Marco Hester III, PA  09/11/18 0520

## 2018-09-12 LAB
ALBUMIN SERPL-MCNC: 3 G/DL (ref 3.5–5.2)
ALBUMIN/GLOB SERPL: 1.1 G/DL
ALP SERPL-CCNC: 45 U/L (ref 39–117)
ALT SERPL W P-5'-P-CCNC: 89 U/L (ref 1–33)
ANION GAP SERPL CALCULATED.3IONS-SCNC: 8.1 MMOL/L
AST SERPL-CCNC: 28 U/L (ref 1–32)
BASOPHILS # BLD AUTO: 0.02 10*3/MM3 (ref 0–0.2)
BASOPHILS NFR BLD AUTO: 0.2 % (ref 0–1.5)
BILIRUB SERPL-MCNC: 0.2 MG/DL (ref 0.1–1.2)
BUN BLD-MCNC: 4 MG/DL (ref 6–20)
BUN/CREAT SERPL: 8.2 (ref 7–25)
CALCIUM SPEC-SCNC: 8.1 MG/DL (ref 8.6–10.5)
CHLORIDE SERPL-SCNC: 108 MMOL/L (ref 98–107)
CO2 SERPL-SCNC: 20.9 MMOL/L (ref 22–29)
CREAT BLD-MCNC: 0.49 MG/DL (ref 0.57–1)
DEPRECATED RDW RBC AUTO: 48.7 FL (ref 37–54)
EOSINOPHIL # BLD AUTO: 0.03 10*3/MM3 (ref 0–0.7)
EOSINOPHIL NFR BLD AUTO: 0.3 % (ref 0.3–6.2)
ERYTHROCYTE [DISTWIDTH] IN BLOOD BY AUTOMATED COUNT: 14.7 % (ref 11.7–13)
GFR SERPL CREATININE-BSD FRML MDRD: >150 ML/MIN/1.73
GLOBULIN UR ELPH-MCNC: 2.8 GM/DL
GLUCOSE BLD-MCNC: 100 MG/DL (ref 65–99)
HCT VFR BLD AUTO: 31 % (ref 35.6–45.5)
HGB BLD-MCNC: 10 G/DL (ref 11.9–15.5)
IMM GRANULOCYTES # BLD: 0.03 10*3/MM3 (ref 0–0.03)
IMM GRANULOCYTES NFR BLD: 0.3 % (ref 0–0.5)
LYMPHOCYTES # BLD AUTO: 2.51 10*3/MM3 (ref 0.9–4.8)
LYMPHOCYTES NFR BLD AUTO: 20.9 % (ref 19.6–45.3)
MCH RBC QN AUTO: 29.2 PG (ref 26.9–32)
MCHC RBC AUTO-ENTMCNC: 32.3 G/DL (ref 32.4–36.3)
MCV RBC AUTO: 90.6 FL (ref 80.5–98.2)
MONOCYTES # BLD AUTO: 1.5 10*3/MM3 (ref 0.2–1.2)
MONOCYTES NFR BLD AUTO: 12.5 % (ref 5–12)
NEUTROPHILS # BLD AUTO: 7.94 10*3/MM3 (ref 1.9–8.1)
NEUTROPHILS NFR BLD AUTO: 66.1 % (ref 42.7–76)
PLATELET # BLD AUTO: 177 10*3/MM3 (ref 140–500)
PMV BLD AUTO: 10.9 FL (ref 6–12)
POTASSIUM BLD-SCNC: 3.7 MMOL/L (ref 3.5–5.2)
PROT SERPL-MCNC: 5.8 G/DL (ref 6–8.5)
RBC # BLD AUTO: 3.42 10*6/MM3 (ref 3.9–5.2)
SODIUM BLD-SCNC: 137 MMOL/L (ref 136–145)
WBC NRBC COR # BLD: 12 10*3/MM3 (ref 4.5–10.7)

## 2018-09-12 PROCEDURE — 99232 SBSQ HOSP IP/OBS MODERATE 35: CPT | Performed by: OBSTETRICS & GYNECOLOGY

## 2018-09-12 PROCEDURE — 25010000002 CEFTRIAXONE PER 250 MG: Performed by: INTERNAL MEDICINE

## 2018-09-12 PROCEDURE — 85025 COMPLETE CBC W/AUTO DIFF WBC: CPT | Performed by: INTERNAL MEDICINE

## 2018-09-12 PROCEDURE — 87522 HEPATITIS C REVRS TRNSCRPJ: CPT | Performed by: OBSTETRICS & GYNECOLOGY

## 2018-09-12 PROCEDURE — 63710000001 PROMETHAZINE PER 25 MG: Performed by: INTERNAL MEDICINE

## 2018-09-12 PROCEDURE — 80053 COMPREHEN METABOLIC PANEL: CPT | Performed by: OBSTETRICS & GYNECOLOGY

## 2018-09-12 RX ADMIN — SODIUM CHLORIDE 150 ML/HR: 9 INJECTION, SOLUTION INTRAVENOUS at 13:50

## 2018-09-12 RX ADMIN — ACETAMINOPHEN 650 MG: 325 TABLET ORAL at 13:47

## 2018-09-12 RX ADMIN — ACETAMINOPHEN 650 MG: 325 TABLET ORAL at 08:42

## 2018-09-12 RX ADMIN — ACETAMINOPHEN 650 MG: 325 TABLET ORAL at 00:10

## 2018-09-12 RX ADMIN — SODIUM CHLORIDE 150 ML/HR: 9 INJECTION, SOLUTION INTRAVENOUS at 05:32

## 2018-09-12 RX ADMIN — PROMETHAZINE HYDROCHLORIDE 25 MG: 25 TABLET ORAL at 13:48

## 2018-09-12 RX ADMIN — CEFTRIAXONE SODIUM 1 G: 1 INJECTION, SOLUTION INTRAVENOUS at 01:30

## 2018-09-12 RX ADMIN — ACETAMINOPHEN 650 MG: 325 TABLET ORAL at 20:30

## 2018-09-12 RX ADMIN — SODIUM CHLORIDE 150 ML/HR: 9 INJECTION, SOLUTION INTRAVENOUS at 20:31

## 2018-09-12 RX ADMIN — PROMETHAZINE HYDROCHLORIDE 25 MG: 25 TABLET ORAL at 20:50

## 2018-09-12 NOTE — PROGRESS NOTES
.  OB Consult Note     Admission date 2018     Patient: Selena Wooten MRN: [id number]   YOB: 1995 Age: 23 y.o. Sex: female     SUBJECTIVE:     Selena Wooten is a 23 y.o.,  AT 12 weeks gestation admitted for pyelonephritis. Patient denies any pain currently. She has some nausea but no emesis. She is tolerating some PO intake. She denies abdominal pain, cramping or vaginal bleeding.     ROS:  Neuro: neg for headache  Pulm: neg for soa  CV: neg for chest pain  GI: pos for nausea, neg for emesis  : neg for pelvic pain, cramping or vaginal bleeding    OBJECTIVE:     Vitals:   Vitals:    18 0001 18 0210 18 0533 18 0700   BP: 114/73   112/68   BP Location: Right arm      Patient Position: Lying      Pulse: 118 104 98 103   Resp: 20   18   Temp: 99.8 °F (37.7 °C) 98.5 °F (36.9 °C) 99 °F (37.2 °C) 99 °F (37.2 °C)   TempSrc: Oral Oral Oral Oral   SpO2:   97% 97%   Weight:       Height:           Intake/Output:   Intake/Output Summary (Last 24 hours) at 18 1218  Last data filed at 18 0900   Gross per 24 hour   Intake           6244.5 ml   Output                0 ml   Net           6244.5 ml        Lab Results (last 7 days)     Procedure Component Value Units Date/Time    Urine Culture - Urine, [[id number]]  (Abnormal) Collected:  18 0108    Specimen:  Urine from Urine, Clean Catch Updated:  18 0831     Urine Culture >100,000 CFU/mL Escherichia coli (A)    Comprehensive Metabolic Panel [[id number]]  (Abnormal) Collected:  18 0645    Specimen:  Blood Updated:  18 0748     Glucose 100 (H) mg/dL      BUN 4 (L) mg/dL      Creatinine 0.49 (L) mg/dL      Sodium 137 mmol/L      Potassium 3.7 mmol/L      Chloride 108 (H) mmol/L      CO2 20.9 (L) mmol/L      Calcium 8.1 (L) mg/dL      Total Protein 5.8 (L) g/dL      Albumin 3.00 (L) g/dL      ALT (SGPT) 89 (H) U/L      AST (SGOT) 28 U/L      Alkaline Phosphatase 45 U/L      Total Bilirubin 0.2 mg/dL       eGFR Non African Amer >150 mL/min/1.73      Globulin 2.8 gm/dL      A/G Ratio 1.1 g/dL      BUN/Creatinine Ratio 8.2     Anion Gap 8.1 mmol/L     CBC & Differential [[id number]] Collected:  09/12/18 0645    Specimen:  Blood Updated:  09/12/18 0734    Narrative:       The following orders were created for panel order CBC & Differential.  Procedure                               Abnormality         Status                     ---------                               -----------         ------                     CBC Auto Differential[[id number]]        Abnormal            Final result                 Please view results for these tests on the individual orders.    CBC Auto Differential [[id number]]  (Abnormal) Collected:  09/12/18 0645    Specimen:  Blood Updated:  09/12/18 0734     WBC 12.00 (H) 10*3/mm3      RBC 3.42 (L) 10*6/mm3      Hemoglobin 10.0 (L) g/dL      Hematocrit 31.0 (L) %      MCV 90.6 fL      MCH 29.2 pg      MCHC 32.3 (L) g/dL      RDW 14.7 (H) %      RDW-SD 48.7 fl      MPV 10.9 fL      Platelets 177 10*3/mm3      Neutrophil % 66.1 %      Lymphocyte % 20.9 %      Monocyte % 12.5 (H) %      Eosinophil % 0.3 %      Basophil % 0.2 %      Immature Grans % 0.3 %      Neutrophils, Absolute 7.94 10*3/mm3      Lymphocytes, Absolute 2.51 10*3/mm3      Monocytes, Absolute 1.50 (H) 10*3/mm3      Eosinophils, Absolute 0.03 10*3/mm3      Basophils, Absolute 0.02 10*3/mm3      Immature Grans, Absolute 0.03 10*3/mm3     Hepatitis C RNA, Quantitative, PCR (graph) [[id number]] Collected:  09/12/18 0645    Specimen:  Blood Updated:  09/12/18 0719    Blood Culture - Blood, [[id number]]  (Normal) Collected:  09/11/18 0027    Specimen:  Blood from Arm, Left Updated:  09/12/18 0045     Blood Culture No growth at 24 hours    Blood Culture - Blood, [[id number]]  (Normal) Collected:  09/11/18 0026    Specimen:  Blood from Arm, Right Updated:  09/12/18 0037     Blood Culture No growth at 24 hours    HIV-1 & HIV-2 Antibodies  [[id number]] Collected:  09/11/18 1139    Specimen:  Blood Updated:  09/11/18 1542    Narrative:       The following orders were created for panel order HIV-1 & HIV-2 Antibodies.  Procedure                               Abnormality         Status                     ---------                               -----------         ------                     HIV-1 / O / 2 Ag / Antib...[[id number]]  Normal              Final result                 Please view results for these tests on the individual orders.    HIV-1 / O / 2 Ag / Antibody 4th Generation [[id number]]  (Normal) Collected:  09/11/18 1139    Specimen:  Blood Updated:  09/11/18 1542     HIV-1/ HIV-2 Non-Reactive     HIV-1 P24 Ag Screen Non-Reactive    Hepatitis Panel, Acute [[id number]]  (Abnormal) Collected:  09/11/18 1139    Specimen:  Blood Updated:  09/11/18 1323     Hepatitis B Surface Ag Non-Reactive     Hep A IgM Non-Reactive     Hep B C IgM Non-Reactive     Hepatitis C Ab Reactive (C)    POC Glucose Once [[id number]]  (Normal) Collected:  09/11/18 0748    Specimen:  Blood Updated:  09/11/18 0749     Glucose 107 mg/dL     Narrative:       Meter: [id number] : [id number] Jose Paniagua RN (Katie)    hCG, Quantitative, Pregnancy [[id number]] Collected:  09/11/18 0035    Specimen:  Blood Updated:  09/11/18 0255     HCG Quantitative 181,400.00 mIU/mL     Narrative:       HCG Ranges by Gestational Age    3 Weeks         5.4 -      72 mIU/mL  4 Weeks        10.2 -     708 mIU/mL  5 Weeks       217   -   8,245 mIU/mL  6 Weeks       152   -  32,177 mIU/mL  7 Weeks     4,059   - 153,767 mIU/mL  8 Weeks    31,366   - 149,094 mIU/mL  9 Weeks    59,109   - 135,901 mIU/mL  10 Weeks   44,186   - 170,409 mIU/mL  12 Weeks   27,107   - 201,615 mIU/mL  14 Weeks   24,302   -  93,646 mIU/mL  15 Weeks   12,540   -  69,747 mIU/mL  16 Weeks    8,904   -  55,332 mIU/mL  17 Weeks    8,240   -  51,793 mIU/mL  18 Weeks    9,649   -  55,271 mIU/mL    Procalcitonin [[id number]]  (Abnormal)  "Collected:  09/11/18 0027    Specimen:  Blood Updated:  09/11/18 0157     Procalcitonin 0.06 (L) ng/mL     Narrative:       As a Marker for Sepsis (Non-Neonates):   1. <0.5 ng/mL represents a low risk of severe sepsis and/or septic shock.  1. >2 ng/mL represents a high risk of severe sepsis and/or septic shock.    As a Marker for Lower Respiratory Tract Infections that require antibiotic therapy:  PCT on Admission     Antibiotic Therapy             6-12 Hrs later  > 0.5                Strongly Recommended            >0.25 - <0.5         Recommended  0.1 - 0.25           Discouraged                   Remeasure/reassess PCT  <0.1                 Strongly Discouraged          Remeasure/reassess PCT      As 28 day mortality risk marker: \"Change in Procalcitonin Result\" (> 80 % or <=80 %) if Day 0 (or Day 1) and Day 4 values are available. Refer to [email or web address]://[email or web address]/   Change in PCT <=80 %   A decrease of PCT levels below or equal to 80 % defines a positive change in PCT test result representing a higher risk for 28-day all-cause mortality of patients diagnosed with severe sepsis or septic shock.  Change in PCT > 80 %   A decrease of PCT levels of more than 80 % defines a negative change in PCT result representing a lower risk for 28-day all-cause mortality of patients diagnosed with severe sepsis or septic shock.                Urine Drug Screen - Urine, Clean Catch [[id number]]  (Normal) Collected:  09/11/18 0120    Specimen:  Urine from Urine, Clean Catch Updated:  09/11/18 0154     Amphet/Methamphet, Screen Negative     Barbiturates Screen, Urine Negative     Benzodiazepine Screen, Urine Negative     Cocaine Screen, Urine Negative     Opiate Screen Negative     THC, Screen, Urine Negative     Methadone Screen, Urine Negative     Oxycodone Screen, Urine Negative    Narrative:       Negative Thresholds For Drugs Screened:     Amphetamines               500 ng/ml   Barbiturates               200 " ng/ml   Benzodiazepines            100 ng/ml   Cocaine                    300 ng/ml   Methadone                  300 ng/ml   Opiates                    300 ng/ml   Oxycodone                  100 ng/ml   THC                        50 ng/ml    The Normal Value for all drugs tested is negative. This report includes final unconfirmed screening results to be used for medical treatment purposes only. Unconfirmed results must not be used for non-medical purposes such as employment or legal testing. Clinical consideration should be applied to any drug of abuse test, particulary when unconfirmed results are used.    hCG, Serum, Qualitative [[id number]]  (Abnormal) Collected:  09/11/18 0035    Specimen:  Blood Updated:  09/11/18 0140     HCG Qualitative Positive (A)    Urinalysis With Microscopic If Indicated (No Culture) - Urine, Clean Catch [[id number]]  (Abnormal) Collected:  09/11/18 0120    Specimen:  Urine from Urine, Clean Catch Updated:  09/11/18 0135     Color, UA Yellow     Appearance, UA Cloudy (A)     pH, UA 8.5 (H)     Specific Gravity, UA 1.017     Glucose, UA Negative     Ketones, UA Negative     Bilirubin, UA Negative     Blood, UA Negative     Protein, UA Negative     Leuk Esterase, UA Moderate (2+) (A)     Nitrite, UA Negative     Urobilinogen, UA 0.2 E.U./dL    Urinalysis, Microscopic Only - Urine, Clean Catch [[id number]]  (Abnormal) Collected:  09/11/18 0120    Specimen:  Urine from Urine, Clean Catch Updated:  09/11/18 0135     RBC, UA 0-2 /HPF      WBC, UA 21-30 (A) /HPF      Bacteria, UA 1+ (A) /HPF      Squamous Epithelial Cells, UA 0-2 /HPF      Hyaline Casts, UA 0-2 /LPF      Methodology Automated Microscopy    Comprehensive Metabolic Panel [[id number]]  (Abnormal) Collected:  09/11/18 0027    Specimen:  Blood Updated:  09/11/18 0110     Glucose 102 (H) mg/dL      BUN 10 mg/dL      Creatinine 0.64 mg/dL      Sodium 135 (L) mmol/L      Potassium 4.5 mmol/L      Chloride 101 mmol/L      CO2 24.3 mmol/L       Calcium 9.2 mg/dL      Total Protein 7.1 g/dL      Albumin 3.90 g/dL      ALT (SGPT) 147 (H) U/L      AST (SGOT) 65 (H) U/L      Alkaline Phosphatase 54 U/L      Total Bilirubin 0.2 mg/dL      eGFR Non African Amer 117 mL/min/1.73      Globulin 3.2 gm/dL      A/G Ratio 1.2 g/dL      BUN/Creatinine Ratio 15.6     Anion Gap 9.7 mmol/L     C-reactive Protein [[id number]]  (Abnormal) Collected:  09/11/18 0027    Specimen:  Blood Updated:  09/11/18 0110     C-Reactive Protein 1.73 (H) mg/dL     Sedimentation Rate [[id number]]  (Abnormal) Collected:  09/11/18 0027    Specimen:  Blood Updated:  09/11/18 0110     Sed Rate 30 (H) mm/hr     Lactic Acid, Plasma [[id number]]  (Normal) Collected:  09/11/18 0027    Specimen:  Blood Updated:  09/11/18 0103     Lactate 1.8 mmol/L     CBC & Differential [[id number]] Collected:  09/11/18 0027    Specimen:  Blood Updated:  09/11/18 0046    Narrative:       The following orders were created for panel order CBC & Differential.  Procedure                               Abnormality         Status                     ---------                               -----------         ------                     CBC Auto Differential[[id number]]        Abnormal            Final result                 Please view results for these tests on the individual orders.    CBC Auto Differential [[id number]]  (Abnormal) Collected:  09/11/18 0027    Specimen:  Blood Updated:  09/11/18 0046     WBC 13.06 (H) 10*3/mm3      RBC 4.13 10*6/mm3      Hemoglobin 12.0 g/dL      Hematocrit 38.4 %      MCV 93.0 fL      MCH 29.1 pg      MCHC 31.3 (L) g/dL      RDW 14.2 (H) %      RDW-SD 48.5 fl      MPV 11.0 fL      Platelets 211 10*3/mm3      Neutrophil % 81.2 (H) %      Lymphocyte % 12.9 (L) %      Monocyte % 4.9 (L) %      Eosinophil % 0.7 %      Basophil % 0.1 %      Immature Grans % 0.2 %      Neutrophils, Absolute 10.61 (H) 10*3/mm3      Lymphocytes, Absolute 1.68 10*3/mm3      Monocytes, Absolute 0.64 10*3/mm3       Eosinophils, Absolute 0.09 10*3/mm3      Basophils, Absolute 0.01 10*3/mm3      Immature Grans, Absolute 0.03 10*3/mm3           Appearance/Psychiatric: In no distress, resting comfortably   Constitutional: The patient is well nourished   Cardiovascular: She does not have edema. Heart RRR,    Respiratory: Respiratory effort is normal. CTAB   Abdomen: Soft and nontender   Back: no CVA tenderness  Ext: nontender, no edema, no cords  Skin: normal turgor, dry     ASSESSMENT AND PLAN:   Patient Active Problem List    Diagnosis   • Pyelonephritis [N12]   • Sepsis (CMS/HCC) [A41.9]   • Pregnancy [Z34.90]   • Elevated LFTs [R79.89]   • IV drug abuse [F19.10]     Overview Note:     9/11/2018  None in 30 days     • Tobacco smoking affecting pregnancy in first trimester [O99.331]   • Chronic hepatitis C affecting pregnancy, antepartum (CMS/[facility], [facility]) [O98.419, B18.2]   • Methamphetamine addiction (CMS/[facility]) [F15.20]     Overview Note:     9/11/2018  methamphetamine abuse for the last 3 years on a daily basis [place] usage from 1/2-7 g  Patient reports abstinence for 30 days          12 week IUP admitted for pyelonephritis: patient notes she is feeling better and temperature curve improving. She has nausea but denies pain currently. On Rocephin with E.Coli positive urine culture, sensitivity pending. Discussed with patient and her mother that she will need to continue antibiotic prophylaxis throughout pregnancy. She was also counseled that close follow-up with her obstetrician and frequent surveillance with repeat urine cultures will be needed throughout pregnancy as well. Discussed the risks associated with recurrent pyelonephritis in pregnancy. Patient voiced understanding; she has an appointment scheduled with a [place] [facility] group to establish care.      Hepatitis C in pregnancy: LFT's are improved today, viral load is pending     LOS: 1 day    Anika Sheldon MD   September 12, 2018

## 2018-09-12 NOTE — PLAN OF CARE
Problem: Patient Care Overview  Goal: Plan of Care Review  Outcome: Ongoing (interventions implemented as appropriate)   09/11/18 2010   Plan of Care Review   Progress no change   OTHER   Outcome Summary Pt transferred to floor this AM. IVF continued. C/O pain at times, tylenol given per order. Temp elevated at times, monitored and medicated.    Coping/Psychosocial   Plan of Care Reviewed With patient      09/11/18 2010   Plan of Care Review   Progress no change   OTHER   Outcome Summary Pt transferred to floor this AM. IVF continued. C/O pain at times, tylenol given per order. Temp elevated at times, monitored and medicated. Denies SOA, N/V/D during shift. Assist to bathroom. OBGYN consulted for pregnancy. Monitored closely   Coping/Psychosocial   Plan of Care Reviewed With patient

## 2018-09-12 NOTE — PLAN OF CARE
Problem: Patient Care Overview  Goal: Plan of Care Review  Outcome: Ongoing (interventions implemented as appropriate)   09/12/18 6036   OTHER   Outcome Summary low grade temp today; medicated with Tylenol and Phenergan per pt request; no emesis; c/o generalized aches; up ad jorge; cont to monitor   Coping/Psychosocial   Plan of Care Reviewed With patient     Goal: Discharge Needs Assessment  Outcome: Ongoing (interventions implemented as appropriate)    Goal: Interprofessional Rounds/Family Conf  Outcome: Ongoing (interventions implemented as appropriate)      Problem: Pain, Acute (Adult)  Goal: Acceptable Pain Control/Comfort Level  Outcome: Ongoing (interventions implemented as appropriate)      Problem: Sepsis/Septic Shock (Adult)  Goal: Signs and Symptoms of Listed Potential Problems Will be Absent, Minimized or Managed (Sepsis/Septic Shock)  Outcome: Ongoing (interventions implemented as appropriate)      Problem: Nausea/Vomiting (Adult)  Goal: Symptom Relief  Outcome: Ongoing (interventions implemented as appropriate)    Goal: Adequate Hydration  Outcome: Ongoing (interventions implemented as appropriate)

## 2018-09-12 NOTE — PROGRESS NOTES
Discharge Planning Assessment  [facility]     Patient Name: Selena Wooten  MRN: [id number]  Today's Date: 9/12/2018    Admit Date: 9/11/2018          Discharge Needs Assessment     Row Name 09/12/18 1348       Living Environment    Lives With parent(s)    Current Living Arrangements home/apartment/condo    Primary Care Provided by self    Provides Primary Care For no one    Family Caregiver if Needed parent(s)    Quality of Family Relationships helpful;involved;supportive    Able to Return to Prior Arrangements yes       Resource/Environmental Concerns    Transportation Concerns car, none       Transition Planning    Patient/Family Anticipates Transition to home with family    Transportation Anticipated family or friend will provide       Discharge Needs Assessment    Readmission Within the Last 30 Days no previous admission in last 30 days    Concerns to be Addressed no discharge needs identified;denies needs/concerns at this time    Equipment Currently Used at Home none    Equipment Needed After Discharge none            Discharge Plan     Row Name 09/12/18 1349       Plan    Plan Home with mother     Plan Comments Spoke with pt and her mother, Diana at bedside. Facesheet info verified. Role of CCP explained. Pharmacy updated. Pt denies any issues or concerns obtaining her medications or remembering to take them. Pt denies having a living will/POA. Pt lives with her mother. Pt was [facility]'s PTA. Pts family/friends will provide transportation at [place]. Pt denies any history of HH or ERICKA. Pt plans to return home with her mother at [place]. Pt denies any discharge planning needs at this time. CCP to follow. Maribell Hanson RN/CCP         Destination     No service coordination in this encounter.      Durable Medical Equipment     No service coordination in this encounter.      Dialysis/Infusion     No service coordination in this encounter.      Home Medical Care     No service coordination in this encounter.       Social Care     No service coordination in this encounter.                Demographic Summary    No documentation.           Functional Status    No documentation.           Psychosocial    No documentation.           Abuse/Neglect    No documentation.           Legal    No documentation.           Substance Abuse    No documentation.           Patient Forms    No documentation.         Maribell Hanson RN

## 2018-09-12 NOTE — PROGRESS NOTES
LOS: 1 day   Patient Care Team:  Jayro Mccauley MD as PCP - General (Family Medicine)    Chief Complaint   Patient presents with   • Fever   • Generalized Body Aches         Subjective   Feels much better. No nausea today. Back still hurting but not as bad     Objective     Vital Signs  Temp:  [98.4 °F (36.9 °C)-102.9 °F (39.4 °C)] 99 °F (37.2 °C)  Heart Rate:  [] 103  Resp:  [16-20] 18  BP: (107-117)/(58-73) 112/68    Physical Exam:  WDWN female in NAD. Alert & oriented.  Lungs clear  Heart RRR  Abd soft, NT, BS+  Ext no edema.  Skin warm & dry       Results Review:     I reviewed the patient's new clinical results.    Medication Review:       LABS    Results from last 7 days  Lab Units 09/12/18  0645 09/11/18  0027   SODIUM mmol/L 137 135*   POTASSIUM mmol/L 3.7 4.5   CHLORIDE mmol/L 108* 101   CO2 mmol/L 20.9* 24.3   BUN mg/dL 4* 10   CREATININE mg/dL 0.49* 0.64   GLUCOSE mg/dL 100* 102*   CALCIUM mg/dL 8.1* 9.2       Results from last 7 days  Lab Units 09/12/18  0645 09/11/18  0027   WBC 10*3/mm3 12.00* 13.06*   HEMOGLOBIN g/dL 10.0* 12.0   HEMATOCRIT % 31.0* 38.4   PLATELETS 10*3/mm3 177 211               Assessment/Plan     Principal Problem:    Sepsis (CMS/[facility]) - improved.   Active Problems:    Pyelonephritis - culture growing E coli. Sensitivity pending    Pregnancy    Elevated LFTs    IV drug abuse    Tobacco smoking affecting pregnancy in first trimester    Chronic hepatitis C affecting pregnancy, antepartum (CMS/[facility])    Methamphetamine addiction (CMS/[facility])          Leticia Lyle MD  09/12/18  10:40 AM      Time:

## 2018-09-12 NOTE — PLAN OF CARE
Problem: Patient Care Overview  Goal: Plan of Care Review  Outcome: Ongoing (interventions implemented as appropriate)   09/12/18 6458   Plan of Care Review   Progress improving   OTHER   Outcome Summary Tmax 102.9, treated with tylenol & effective; no emesis this shift; received oral phenergan early on in shift which was effective; up ad jorge to BR; ivf's ongoing; adequate oral intake; pt reports prenatal appt scheduled OCt 12 which will be first visit; reports taking PNV; d/c planning concerns, CCP involved; SR to ST on monitor; cont current POC & supportive care   Coping/Psychosocial   Plan of Care Reviewed With patient       Problem: Pain, Acute (Adult)  Goal: Identify Related Risk Factors and Signs and Symptoms  Outcome: Outcome(s) achieved Date Met: 09/12/18    Goal: Acceptable Pain Control/Comfort Level  Outcome: Ongoing (interventions implemented as appropriate)      Problem: Sepsis/Septic Shock (Adult)  Goal: Signs and Symptoms of Listed Potential Problems Will be Absent, Minimized or Managed (Sepsis/Septic Shock)  Outcome: Ongoing (interventions implemented as appropriate)      Problem: Nausea/Vomiting (Adult)  Goal: Identify Related Risk Factors and Signs and Symptoms  Outcome: Outcome(s) achieved Date Met: 09/12/18    Goal: Symptom Relief  Outcome: Ongoing (interventions implemented as appropriate)    Goal: Adequate Hydration  Outcome: Ongoing (interventions implemented as appropriate)

## 2018-09-13 ENCOUNTER — APPOINTMENT (OUTPATIENT)
Dept: CARDIOLOGY | Facility: HOSPITAL | Age: 23
End: 2018-09-13
Attending: INTERNAL MEDICINE

## 2018-09-13 VITALS
HEIGHT: 62 IN | TEMPERATURE: 97.8 F | WEIGHT: 147.05 LBS | SYSTOLIC BLOOD PRESSURE: 114 MMHG | OXYGEN SATURATION: 95 % | DIASTOLIC BLOOD PRESSURE: 69 MMHG | BODY MASS INDEX: 27.06 KG/M2 | HEART RATE: 89 BPM | RESPIRATION RATE: 18 BRPM

## 2018-09-13 LAB
ANION GAP SERPL CALCULATED.3IONS-SCNC: 7.1 MMOL/L
AORTIC DIMENSIONLESS INDEX: 0.8 (DI)
BACTERIA SPEC AEROBE CULT: ABNORMAL
BASOPHILS # BLD AUTO: 0.03 10*3/MM3 (ref 0–0.2)
BASOPHILS NFR BLD AUTO: 0.4 % (ref 0–1.5)
BH CV ECHO MEAS - ACS: 2 CM
BH CV ECHO MEAS - AO MEAN PG (FULL): 3 MMHG
BH CV ECHO MEAS - AO MEAN PG: 5 MMHG
BH CV ECHO MEAS - AO ROOT AREA (BSA CORRECTED): 1.5
BH CV ECHO MEAS - AO ROOT AREA: 4.9 CM^2
BH CV ECHO MEAS - AO ROOT DIAM: 2.5 CM
BH CV ECHO MEAS - AO V2 MEAN: 106 CM/SEC
BH CV ECHO MEAS - AO V2 VTI: 32.5 CM
BH CV ECHO MEAS - AVA(I,A): 1.8 CM^2
BH CV ECHO MEAS - AVA(I,D): 1.8 CM^2
BH CV ECHO MEAS - BSA(HAYCOCK): 1.7 M^2
BH CV ECHO MEAS - BSA: 1.7 M^2
BH CV ECHO MEAS - BZI_BMI: 26.9 KILOGRAMS/M^2
BH CV ECHO MEAS - BZI_METRIC_HEIGHT: 157.5 CM
BH CV ECHO MEAS - BZI_METRIC_WEIGHT: 66.7 KG
BH CV ECHO MEAS - EDV(CUBED): 64 ML
BH CV ECHO MEAS - EDV(MOD-SP2): 63 ML
BH CV ECHO MEAS - EDV(MOD-SP4): 80 ML
BH CV ECHO MEAS - EDV(TEICH): 70 ML
BH CV ECHO MEAS - EF(CUBED): 69.2 %
BH CV ECHO MEAS - EF(MOD-BP): 59 %
BH CV ECHO MEAS - EF(MOD-SP2): 60.3 %
BH CV ECHO MEAS - EF(MOD-SP4): 56.3 %
BH CV ECHO MEAS - EF(TEICH): 61.4 %
BH CV ECHO MEAS - ESV(CUBED): 19.7 ML
BH CV ECHO MEAS - ESV(MOD-SP2): 25 ML
BH CV ECHO MEAS - ESV(MOD-SP4): 35 ML
BH CV ECHO MEAS - ESV(TEICH): 27 ML
BH CV ECHO MEAS - FS: 32.5 %
BH CV ECHO MEAS - IVS/LVPW: 1
BH CV ECHO MEAS - IVSD: 0.9 CM
BH CV ECHO MEAS - LAT PEAK E' VEL: 13 CM/SEC
BH CV ECHO MEAS - LV DIASTOLIC VOL/BSA (35-75): 47.7 ML/M^2
BH CV ECHO MEAS - LV MASS(C)D: 109.7 GRAMS
BH CV ECHO MEAS - LV MASS(C)DI: 65.4 GRAMS/M^2
BH CV ECHO MEAS - LV MAX PG: 0 MMHG
BH CV ECHO MEAS - LV MEAN PG: 2 MMHG
BH CV ECHO MEAS - LV SYSTOLIC VOL/BSA (12-30): 20.9 ML/M^2
BH CV ECHO MEAS - LV V1 MAX: 2.2 CM/SEC
BH CV ECHO MEAS - LV V1 MEAN: 63.1 CM/SEC
BH CV ECHO MEAS - LV V1 VTI: 20.1 CM
BH CV ECHO MEAS - LVIDD: 4 CM
BH CV ECHO MEAS - LVIDS: 2.7 CM
BH CV ECHO MEAS - LVLD AP2: 7 CM
BH CV ECHO MEAS - LVLD AP4: 7.4 CM
BH CV ECHO MEAS - LVLS AP2: 5.8 CM
BH CV ECHO MEAS - LVLS AP4: 5.7 CM
BH CV ECHO MEAS - LVOT AREA (M): 2.8 CM^2
BH CV ECHO MEAS - LVOT AREA: 2.8 CM^2
BH CV ECHO MEAS - LVOT DIAM: 1.9 CM
BH CV ECHO MEAS - LVPWD: 0.9 CM
BH CV ECHO MEAS - MED PEAK E' VEL: 17 CM/SEC
BH CV ECHO MEAS - MV A DUR: 0.12 SEC
BH CV ECHO MEAS - MV A MAX VEL: 86.6 CM/SEC
BH CV ECHO MEAS - MV DEC SLOPE: 570 CM/SEC^2
BH CV ECHO MEAS - MV DEC TIME: 0.18 SEC
BH CV ECHO MEAS - MV E MAX VEL: 109 CM/SEC
BH CV ECHO MEAS - MV E/A: 1.3
BH CV ECHO MEAS - MV MEAN PG: 2 MMHG
BH CV ECHO MEAS - MV P1/2T MAX VEL: 112 CM/SEC
BH CV ECHO MEAS - MV P1/2T: 57.6 MSEC
BH CV ECHO MEAS - MV V2 MEAN: 72.1 CM/SEC
BH CV ECHO MEAS - MV V2 VTI: 24.5 CM
BH CV ECHO MEAS - MVA P1/2T LCG: 2 CM^2
BH CV ECHO MEAS - MVA(P1/2T): 3.8 CM^2
BH CV ECHO MEAS - MVA(VTI): 2.3 CM^2
BH CV ECHO MEAS - PA ACC SLOPE: 555 CM/SEC^2
BH CV ECHO MEAS - PA ACC TIME: 0.17 SEC
BH CV ECHO MEAS - PA MAX PG: 3.6 MMHG
BH CV ECHO MEAS - PA PR(ACCEL): 3 MMHG
BH CV ECHO MEAS - PA V2 MAX: 94.7 CM/SEC
BH CV ECHO MEAS - PULM A REVS DUR: 0.13 SEC
BH CV ECHO MEAS - PULM A REVS VEL: 26.2 CM/SEC
BH CV ECHO MEAS - PULM DIAS VEL: 50.8 CM/SEC
BH CV ECHO MEAS - PULM S/D: 0.74
BH CV ECHO MEAS - PULM SYS VEL: 37.8 CM/SEC
BH CV ECHO MEAS - QP/QS: 0.99
BH CV ECHO MEAS - RAP SYSTOLE: 3 MMHG
BH CV ECHO MEAS - RV MEAN PG: 1 MMHG
BH CV ECHO MEAS - RV V1 MEAN: 54.4 CM/SEC
BH CV ECHO MEAS - RV V1 VTI: 17.9 CM
BH CV ECHO MEAS - RVOT AREA: 3.1 CM^2
BH CV ECHO MEAS - RVOT DIAM: 2 CM
BH CV ECHO MEAS - RVSP: 26.6 MMHG
BH CV ECHO MEAS - SI(AO): 95.1 ML/M^2
BH CV ECHO MEAS - SI(CUBED): 26.4 ML/M^2
BH CV ECHO MEAS - SI(LVOT): 34 ML/M^2
BH CV ECHO MEAS - SI(MOD-SP2): 22.7 ML/M^2
BH CV ECHO MEAS - SI(MOD-SP4): 26.8 ML/M^2
BH CV ECHO MEAS - SI(TEICH): 25.6 ML/M^2
BH CV ECHO MEAS - SV(AO): 159.5 ML
BH CV ECHO MEAS - SV(CUBED): 44.3 ML
BH CV ECHO MEAS - SV(LVOT): 57.1 ML
BH CV ECHO MEAS - SV(MOD-SP2): 38 ML
BH CV ECHO MEAS - SV(MOD-SP4): 45 ML
BH CV ECHO MEAS - SV(RVOT): 56.2 ML
BH CV ECHO MEAS - SV(TEICH): 43 ML
BH CV ECHO MEAS - TAPSE (>1.6): 2.2 CM2
BH CV ECHO MEAS - TR MAX VEL: 243 CM/SEC
BH CV ECHO MEASUREMENTS AVERAGE E/E' RATIO: 7.27
BH CV VAS BP LEFT ARM: NORMAL MMHG
BH CV XLRA - RV BASE: 3.5 CM
BH CV XLRA - TDI S': 15 CM/SEC
BUN BLD-MCNC: 5 MG/DL (ref 6–20)
BUN/CREAT SERPL: 9.6 (ref 7–25)
CALCIUM SPEC-SCNC: 8.1 MG/DL (ref 8.6–10.5)
CHLORIDE SERPL-SCNC: 108 MMOL/L (ref 98–107)
CO2 SERPL-SCNC: 21.9 MMOL/L (ref 22–29)
CREAT BLD-MCNC: 0.52 MG/DL (ref 0.57–1)
DEPRECATED RDW RBC AUTO: 50 FL (ref 37–54)
EOSINOPHIL # BLD AUTO: 0.19 10*3/MM3 (ref 0–0.7)
EOSINOPHIL NFR BLD AUTO: 2.8 % (ref 0.3–6.2)
ERYTHROCYTE [DISTWIDTH] IN BLOOD BY AUTOMATED COUNT: 14.7 % (ref 11.7–13)
GFR SERPL CREATININE-BSD FRML MDRD: 149 ML/MIN/1.73
GLUCOSE BLD-MCNC: 93 MG/DL (ref 65–99)
HCT VFR BLD AUTO: 30.4 % (ref 35.6–45.5)
HGB BLD-MCNC: 9.5 G/DL (ref 11.9–15.5)
IMM GRANULOCYTES # BLD: 0 10*3/MM3 (ref 0–0.03)
IMM GRANULOCYTES NFR BLD: 0 % (ref 0–0.5)
LEFT ATRIUM VOLUME INDEX: 23.2 ML/M2
LV EF 2D ECHO EST: 59 %
LYMPHOCYTES # BLD AUTO: 2.47 10*3/MM3 (ref 0.9–4.8)
LYMPHOCYTES NFR BLD AUTO: 36.1 % (ref 19.6–45.3)
MAXIMAL PREDICTED HEART RATE: 199 BPM
MCH RBC QN AUTO: 29.1 PG (ref 26.9–32)
MCHC RBC AUTO-ENTMCNC: 31.3 G/DL (ref 32.4–36.3)
MCV RBC AUTO: 93 FL (ref 80.5–98.2)
MONOCYTES # BLD AUTO: 0.86 10*3/MM3 (ref 0.2–1.2)
MONOCYTES NFR BLD AUTO: 12.6 % (ref 5–12)
NEUTROPHILS # BLD AUTO: 3.3 10*3/MM3 (ref 1.9–8.1)
NEUTROPHILS NFR BLD AUTO: 48.1 % (ref 42.7–76)
PLATELET # BLD AUTO: 147 10*3/MM3 (ref 140–500)
PMV BLD AUTO: 10.4 FL (ref 6–12)
POTASSIUM BLD-SCNC: 4.1 MMOL/L (ref 3.5–5.2)
RBC # BLD AUTO: 3.27 10*6/MM3 (ref 3.9–5.2)
SODIUM BLD-SCNC: 137 MMOL/L (ref 136–145)
STRESS TARGET HR: 169 BPM
WBC NRBC COR # BLD: 6.85 10*3/MM3 (ref 4.5–10.7)

## 2018-09-13 PROCEDURE — 93306 TTE W/DOPPLER COMPLETE: CPT | Performed by: INTERNAL MEDICINE

## 2018-09-13 PROCEDURE — 99238 HOSP IP/OBS DSCHRG MGMT 30/<: CPT | Performed by: OBSTETRICS & GYNECOLOGY

## 2018-09-13 PROCEDURE — 85025 COMPLETE CBC W/AUTO DIFF WBC: CPT | Performed by: INTERNAL MEDICINE

## 2018-09-13 PROCEDURE — 93306 TTE W/DOPPLER COMPLETE: CPT

## 2018-09-13 PROCEDURE — 25010000002 CEFTRIAXONE PER 250 MG: Performed by: INTERNAL MEDICINE

## 2018-09-13 PROCEDURE — 80048 BASIC METABOLIC PNL TOTAL CA: CPT | Performed by: INTERNAL MEDICINE

## 2018-09-13 RX ORDER — CEPHALEXIN 500 MG/1
500 CAPSULE ORAL EVERY 8 HOURS SCHEDULED
Qty: 30 CAPSULE | Refills: 0 | Status: SHIPPED | OUTPATIENT
Start: 2018-09-13 | End: 2018-09-23

## 2018-09-13 RX ORDER — CEPHALEXIN 500 MG/1
500 CAPSULE ORAL EVERY 8 HOURS SCHEDULED
Status: DISCONTINUED | OUTPATIENT
Start: 2018-09-13 | End: 2018-09-13 | Stop reason: HOSPADM

## 2018-09-13 RX ADMIN — CEFTRIAXONE SODIUM 1 G: 1 INJECTION, SOLUTION INTRAVENOUS at 01:14

## 2018-09-13 RX ADMIN — ACETAMINOPHEN 650 MG: 325 TABLET ORAL at 01:18

## 2018-09-13 NOTE — PROGRESS NOTES
ANTEPARTUM CONSULT NOTE     Admission date 2018     Patient: Selena Wooten MRN: [id number]   YOB: 1995 Age: 23 y.o. Sex: female     Chief Complaint   Patient presents with   • Fever   • Generalized Body Aches       HPI:2    Selena Wooten is a 23 y.o.,  AT Unknown admitted for pyelonephritis. She seems to be responding well to IV antibiotics. Culture showed Escherichia coli preliminary sensitivity confirms susceptibility with current regimen  ultrasound performed confirmed a viable intrauterine pregnancy approximately 12 weeks. Patient underwent echocardiogram is morning and results are pending.      Past Medical History:   Diagnosis Date   • Asthma     during childhood   • IV drug abuse    • Raynaud disease      History reviewed. No pertinent surgical history.  No current facility-administered medications on file prior to encounter.      No current outpatient prescriptions on file prior to encounter.       ROS:      Except as outlined in history of physical illness, patient denies any changes in her GYN, , GI systems. All other systems reviewed are negative.      OBJECTIVE:     Vitals:   Vitals:    18 1400 18 2034 18 2300 18 0752   BP: 109/56 111/67 116/74 114/69   BP Location:  Right arm Right arm Right arm   Patient Position:  Lying Sitting Sitting   Pulse: 107 89     Resp:    Temp: 98.1 °F (36.7 °C) 98.6 °F (37 °C) 98 °F (36.7 °C) 97.8 °F (36.6 °C)   TempSrc: Oral Oral Oral Oral   SpO2: 100% 100% 98% 95%   Weight:       Height:             Appearance/Psychiatric: In no distress   Constitutional: The patient is well nourished   Cardiovascular: She does not have edema. Heart RRR  Abdomen: Soft, gravid.      Patient Active Problem List    Diagnosis   • Pyelonephritis [N12]   • Sepsis (CMS/HCC) [A41.9]   • Pregnancy [Z34.90]   • Elevated LFTs [R79.89]   • IV drug abuse [F19.10]     Overview Note:     2018  None in 30 days     • Tobacco smoking  affecting pregnancy in first trimester [O99.331]   • Chronic hepatitis C affecting pregnancy, antepartum (CMS/HCC) [O98.419, B18.2]   • Methamphetamine addiction (CMS/[facility]) [F15.20]     Overview Note:     9/11/2018  methamphetamine abuse for the last 3 years on a daily basis [place] usage from 1/2-7 g  Patient reports abstinence for 30 days         LOS: 2 days    Markos Price MD   September 13, 2018    Assessment and Plan: Resolving polynephritis in the end of the first trimester pregnancy. IV antibiotics were confirmed to be appropriate and susceptibility testing was consistent with current regimen. Internal medicine tentatively discussing discharge later today. As previously discussed with patient and family would recommend prophylactic oral antibiotics upon discharge area also discussed the importance of keeping her previously scheduled clinic visit and having regular urine cultures along with urinalysis performed throughout the pregnancy. Patient and her mother are in agreement.

## 2018-09-13 NOTE — DISCHARGE SUMMARY
Date of Discharge:  9/13/2018  Date of Admit: 9/11/2018    Discharge Diagnosis:  Principal Problem:    Sepsis (CMS/HCC)  Active Problems:    Pyelonephritis    Pregnancy    Elevated LFTs    IV drug abuse    Tobacco smoking affecting pregnancy in first trimester    Chronic hepatitis C affecting pregnancy, antepartum (CMS/HCC)    Methamphetamine addiction (CMS/HCC)      Procedures Performed         Consults     Date and Time Order Name Status Description    9/11/2018 1147 Inpatient Obstetrics / Gynecology Consult Completed     9/11/2018 0334 [facility] (on-call MD unless specified) Completed             Hospital Course:   22 yo female who was admitted with pyelonephritis accompanied by nausea and vomiting. This was complicated by the fact that she is 12 weeks pregnant and has a h/o IVDA. Her AST and ALT were both elevated so a hepatitis panel was drawn which came back positive for Hepatitis C.   She was started on IV Ceftriaxone as well as antiemetics and IVFs. She was seen in consultation by Dr Kain Wallace, OB, and he agreed with the treatment initiated. She is already set up to follow up with [facility].  Her back pain, nausea and vomiting have resolved and she has been afebrile for 24 hrs now. Her white count has normalized, blood cultures are negative and her urine culture is growing E coli that is sensitive to Cefazolin so she is being placed on po Cephalexin.   She is tolerating her diet and reports she feels much better. She is also motivated to maintain sobriety from drugs. She was not given any narcotics in the hospital.          Physical Exam:  WDWN female in NAD. Alert & oriented.  Lungs clear  Heart RRR  Abd soft, NT, BS+  Ext no edema.  Skin warm & dry      Discharge Medications     Discharge Medications      New Medications      Instructions Start Date   cephalexin 500 MG capsule  Commonly known as:  KEFLEX   500 mg, Oral, Every 8 Hours Scheduled               Activity at Discharge:     Follow-up  Appointments  Additional Instructions for the Follow-ups that You Need to Schedule     Discharge Follow-up with Specified Provider: Faizan BEDOYA as scheduled    As directed      To:  Fazian BEDOYA as scheduled           Follow-up Information     Jayro Mccauley MD .    Specialty:  Family Medicine  Contact information:  [address]  [facility] [address] [phone]                   DISCHARGE DISPOSITION:     Greater than 30 minutes spent on discharge      Leticia Lyle MD  09/13/18  11:27 AM

## 2018-09-13 NOTE — PLAN OF CARE
Problem: Patient Care Overview  Goal: Plan of Care Review  Outcome: Ongoing (interventions implemented as appropriate)   09/13/18 0559   Plan of Care Review   Progress improving   OTHER   Outcome Summary Patient resting all night. Complained of low back pain and medicated with Tylenol x 2. IV fluids infusing well. Continue IV antibiotic. for possible discharge today.   Coping/Psychosocial   Plan of Care Reviewed With patient     Goal: Individualization and Mutuality  Outcome: Ongoing (interventions implemented as appropriate)    Goal: Discharge Needs Assessment  Outcome: Ongoing (interventions implemented as appropriate)    Goal: Interprofessional Rounds/Family Conf  Outcome: Ongoing (interventions implemented as appropriate)      Problem: Pain, Acute (Adult)  Goal: Acceptable Pain Control/Comfort Level  Outcome: Ongoing (interventions implemented as appropriate)      Problem: Sepsis/Septic Shock (Adult)  Goal: Signs and Symptoms of Listed Potential Problems Will be Absent, Minimized or Managed (Sepsis/Septic Shock)  Outcome: Ongoing (interventions implemented as appropriate)      Problem: Nausea/Vomiting (Adult)  Goal: Symptom Relief  Outcome: Ongoing (interventions implemented as appropriate)    Goal: Adequate Hydration  Outcome: Ongoing (interventions implemented as appropriate)

## 2018-09-14 ENCOUNTER — TELEPHONE (OUTPATIENT)
Dept: OBSTETRICS AND GYNECOLOGY | Age: 23
End: 2018-09-14

## 2018-09-14 LAB
HCV RNA SERPL NAA+PROBE-ACNC: NORMAL IU/ML
HCV RNA SERPL NAA+PROBE-LOG IU: 5.62 LOG10 IU/ML
TEST INFORMATION: NORMAL

## 2018-09-14 NOTE — TELEPHONE ENCOUNTER
Patient advised and verbalized understanding.  She will be going back to her ob/gyn through [facility] and will discuss treatment there.

## 2018-09-14 NOTE — PROGRESS NOTES
Notify patient: Hepatitis C viral load is elevated    No treatment is recommended during pregnancy.  The patient will need surveillance of the disease throughout her lifetime and possible treatment after pregnancy    It's recommended she receive hepatitis B vaccine series to protect her from coinfection.    Undertreated hepatitis C over a long period time will increase her risk of liver cancer

## 2018-09-14 NOTE — TELEPHONE ENCOUNTER
----- Message from Kain Wallace MD sent at 9/14/2018 11:57 AM EDT -----  Notify patient: Hepatitis C viral load is elevated    No treatment is recommended during pregnancy.  The patient will need surveillance of the disease throughout her lifetime and possible treatment after pregnancy    It's recommended she receive hepatitis B vaccine series to protect her from coinfection.    Undertreated hepatitis C over a long period time will increase her risk of liver cancer

## 2018-09-16 LAB
BACTERIA SPEC AEROBE CULT: NORMAL
BACTERIA SPEC AEROBE CULT: NORMAL